# Patient Record
Sex: FEMALE | Race: BLACK OR AFRICAN AMERICAN | Employment: UNEMPLOYED | URBAN - METROPOLITAN AREA
[De-identification: names, ages, dates, MRNs, and addresses within clinical notes are randomized per-mention and may not be internally consistent; named-entity substitution may affect disease eponyms.]

---
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
## 2019-06-14 DIAGNOSIS — N94.6 DYSMENORRHEA: ICD-10-CM

## 2019-06-14 RX ORDER — IBUPROFEN 800 MG/1
TABLET ORAL
Qty: 100 TAB | Refills: 0 | Status: SHIPPED | OUTPATIENT
Start: 2019-06-14 | End: 2021-08-25

## 2019-06-18 ENCOUNTER — OFFICE VISIT (OUTPATIENT)
Dept: FAMILY MEDICINE CLINIC | Age: 19
End: 2019-06-18

## 2019-06-18 VITALS
DIASTOLIC BLOOD PRESSURE: 80 MMHG | OXYGEN SATURATION: 98 % | SYSTOLIC BLOOD PRESSURE: 100 MMHG | WEIGHT: 156 LBS | HEIGHT: 61 IN | TEMPERATURE: 97 F | HEART RATE: 82 BPM | BODY MASS INDEX: 29.45 KG/M2 | RESPIRATION RATE: 18 BRPM

## 2019-06-18 DIAGNOSIS — N89.8 VAGINAL DISCHARGE: Primary | ICD-10-CM

## 2019-06-18 DIAGNOSIS — B37.31 VAGINAL CANDIDIASIS: ICD-10-CM

## 2019-06-18 RX ORDER — FLUCONAZOLE 150 MG/1
150 TABLET ORAL
Qty: 2 TAB | Refills: 0 | Status: SHIPPED | OUTPATIENT
Start: 2019-06-18 | End: 2019-06-22

## 2019-06-18 RX ORDER — KETOCONAZOLE 20 MG/G
CREAM TOPICAL DAILY
Qty: 30 G | Refills: 0 | Status: SHIPPED | OUTPATIENT
Start: 2019-06-18 | End: 2021-08-25

## 2019-06-18 NOTE — PROGRESS NOTES
History of Present Illness  Rhoda Chahal is a 20 y.o. female who presents today for management of    Chief Complaint   Patient presents with    Vaginal Discharge     odor, itcthing x 1 month        Vaginitis  Patient complains of an abnormal vaginal discharge for 1 month. Vaginal symptoms include discharge described as creamy, malodorous and yellowish. Vulvar symptoms include vulvar itching. STI Risk: Very low risk of STD exposure. Menstrual pattern: She had been bleeding regularly. Contraception: none    Past Medical History  History reviewed. No pertinent past medical history. Surgical History  History reviewed. No pertinent surgical history. Current Medications  Current Outpatient Medications   Medication Sig    ibuprofen (MOTRIN) 800 mg tablet TAKE 1 TABLET BY MOUTH EVERY 8 HOURS AS NEEDED FOR PAIN     No current facility-administered medications for this visit.         Allergies/Drug Reactions  No Known Allergies     Family History  Family History   Problem Relation Age of Onset    No Known Problems Mother     Stroke Father 36    Hypertension Father     Diabetes Father     No Known Problems Sister     No Known Problems Maternal Grandmother     No Known Problems Paternal Grandmother     Stroke Paternal Grandfather         Social History  Social History     Socioeconomic History    Marital status: SINGLE     Spouse name: Not on file    Number of children: Not on file    Years of education: Not on file    Highest education level: Not on file   Occupational History    Not on file   Social Needs    Financial resource strain: Not on file    Food insecurity:     Worry: Not on file     Inability: Not on file    Transportation needs:     Medical: Not on file     Non-medical: Not on file   Tobacco Use    Smoking status: Never Smoker    Smokeless tobacco: Never Used   Substance and Sexual Activity    Alcohol use: Yes     Comment: socially    Drug use: No    Sexual activity: Yes     Partners: Male     Birth control/protection: Condom   Lifestyle    Physical activity:     Days per week: Not on file     Minutes per session: Not on file    Stress: Not on file   Relationships    Social connections:     Talks on phone: Not on file     Gets together: Not on file     Attends Faith service: Not on file     Active member of club or organization: Not on file     Attends meetings of clubs or organizations: Not on file     Relationship status: Not on file    Intimate partner violence:     Fear of current or ex partner: Not on file     Emotionally abused: Not on file     Physically abused: Not on file     Forced sexual activity: Not on file   Other Topics Concern    Not on file   Social History Narrative    Not on file       Review of Systems  Negative except as mentioned in HPI      Physical Exam  Vital signs:   Vitals:    06/18/19 0758   BP: 100/80   Pulse: 82   Resp: 18   Temp: 97 °F (36.1 °C)   TempSrc: Oral   SpO2: 98%   Weight: 156 lb (70.8 kg)   Height: 5' 1\" (1.549 m)       General: alert, oriented, not in distress  Eyes: clear conjunctivae, anicteric sclerae, full and equal ROMs  Pelvic exam: VULVA: normal appearing vulva with no masses, tenderness or lesions, VAGINA: erythematous vaginal walls, whitish curdlike discharge, no lesions, CERVIX: normal appearing cervix without discharge or lesions, URETHRAL MEATUS: no erythema or lesions present, PERINEUM: no evidence of trauma, no rashes or skin lesions present, ANUS: within normal limits, no hemorrhoids present      Laboratory/Tests:  Component      Latest Ref Rng & Units 11/5/2018 11/5/2018 11/5/2018 11/5/2018           8:44 AM  8:44 AM  8:44 AM  8:44 AM   Glucose      70 - 99 mg/dL 99      BUN      6 - 22 mg/dL 13      Creatinine      0.5 - 1.2 mg/dL 0.6      Sodium      133 - 145 mmol/L 140      Potassium      3.5 - 5.5 mmol/L 4.5      Chloride      98 - 110 mmol/L 100      CO2      20 - 32 mmol/L 23      AST      10 - 37 U/L 17      ALT (SGPT) 5 - 40 U/L 11      Alk. phosphatase      25 - 115 U/L 62      Bilirubin, total      0.2 - 1.2 mg/dL <0.1 (L)      Calcium      8.4 - 10.5 mg/dL 9.4      Protein, total      6.4 - 8.3 g/dL 7.1      Albumin      3.5 - 5.0 g/dL 4.3      A-G Ratio      1.1 - 2.6 ratio 1.5      Globulin      2.0 - 4.0 g/dL 2.8      Anion gap      mmol/L 17.0      GFRAA      >60.0 >60.0      GFRNA      >60.0 >60.0      WBC      4.0 - 11.0 K/uL   5.9    RBC      3.80 - 5.20 M/uL   4.94    HGB      11.7 - 15.5 g/dL   10.2 (L)    HCT      35.1 - 46.5 %   35.5    MCV      80 - 95 fL   72 (L)    MCH      26 - 34 pg   21 (L)    MCHC      31 - 36 g/dL   29 (L)    RDW      10.0 - 15.5 %   17.4 (H)    PLATELET      176 - 114 K/uL   249    Triglyceride      40 - 149 mg/dL  47     HDL Cholesterol      40 - 59 mg/dL  48     Cholesterol, total      110 - 200 mg/dL  134     CHOLESTEROL/HDL      0.0 - 5.0  2.8     LDL, calculated      50 - 99 mg/dL  77     VLDL, calculated      8 - 30 mg/dL  9     Hemoglobin A1c, (calculated)      4.8 - 5.9 %    5.2   AVG GLU      91 - 123 mg/dL    102       Assessment/Plan:      ICD-10-CM ICD-9-CM    1. Vaginal discharge N89.8 623.5 NUSWAB VAGINITIS PLUS   2. Vaginal candidiasis B37.3 112.1 fluconazole (DIFLUCAN) 150 mg tablet      ketoconazole (NIZORAL) 2 % topical cream           Follow-up and Dispositions    · Return in about 5 months (around 11/18/2019), or if symptoms worsen or fail to improve, for annual physical exam.           I have discussed the diagnosis with the patient and the intended plan as seen in the above orders. The patient has received an after-visit summary and questions were answered concerning future plans. I have discussed medication side effects and warnings with the patient as well. I have reviewed the plan of care with the patient, accepted their input and they are in agreement with the treatment goals.        Loren Alberto MD  June 18, 2019

## 2019-06-18 NOTE — PROGRESS NOTES
1. Have you been to the ER, urgent care clinic since your last visit? Hospitalized since your last visit? No    2. Have you seen or consulted any other health care providers outside of the [address] [place] [address] since your last visit? Include any pap smears or colon screening.  No

## 2019-06-20 LAB
A VAGINAE DNA VAG QL NAA+PROBE: ABNORMAL SCORE
BVAB2 DNA VAG QL NAA+PROBE: ABNORMAL SCORE
C ALBICANS DNA VAG QL NAA+PROBE: POSITIVE
C GLABRATA DNA VAG QL NAA+PROBE: NEGATIVE
C TRACH RRNA SPEC QL NAA+PROBE: NEGATIVE
MEGA1 DNA VAG QL NAA+PROBE: ABNORMAL SCORE
N GONORRHOEA RRNA SPEC QL NAA+PROBE: NEGATIVE
SPECIMEN STATUS REPORT, ROLRST: NORMAL
T VAGINALIS RRNA SPEC QL NAA+PROBE: NEGATIVE

## 2021-08-25 ENCOUNTER — OFFICE VISIT (OUTPATIENT)
Dept: FAMILY MEDICINE CLINIC | Age: 21
End: 2021-08-25
Payer: MEDICAID

## 2021-08-25 VITALS
HEIGHT: 61 IN | SYSTOLIC BLOOD PRESSURE: 103 MMHG | DIASTOLIC BLOOD PRESSURE: 73 MMHG | RESPIRATION RATE: 15 BRPM | OXYGEN SATURATION: 99 % | TEMPERATURE: 98 F | BODY MASS INDEX: 32.51 KG/M2 | WEIGHT: 172.2 LBS | HEART RATE: 73 BPM

## 2021-08-25 DIAGNOSIS — N89.8 VAGINAL DISCHARGE: ICD-10-CM

## 2021-08-25 DIAGNOSIS — R07.89 RIGHT-SIDED CHEST WALL PAIN: ICD-10-CM

## 2021-08-25 DIAGNOSIS — Z11.3 ROUTINE SCREENING FOR STI (SEXUALLY TRANSMITTED INFECTION): ICD-10-CM

## 2021-08-25 DIAGNOSIS — Z01.419 WELL WOMAN EXAM WITH ROUTINE GYNECOLOGICAL EXAM: Primary | ICD-10-CM

## 2021-08-25 DIAGNOSIS — R51.9 CHRONIC DAILY HEADACHE: ICD-10-CM

## 2021-08-25 PROCEDURE — 99395 PREV VISIT EST AGE 18-39: CPT | Performed by: INTERNAL MEDICINE

## 2021-08-25 RX ORDER — AMITRIPTYLINE HYDROCHLORIDE 10 MG/1
10 TABLET, FILM COATED ORAL
Qty: 90 TABLET | Refills: 0 | Status: SHIPPED | OUTPATIENT
Start: 2021-08-25 | End: 2021-11-29 | Stop reason: SDUPTHER

## 2021-08-25 RX ORDER — MINOCYCLINE HYDROCHLORIDE 50 MG/1
TABLET ORAL
COMMUNITY
Start: 2020-06-08

## 2021-08-25 NOTE — PROGRESS NOTES
Janis Damon presents today for No chief complaint on file. Is someone accompanying this pt? no    Is the patient using any DME equipment during [address]? no    Depression Screening:  3 most recent PHQ Screens 8/25/2021   Little interest or pleasure in doing things Not at all   Feeling down, depressed, irritable, or hopeless Not at all   Total Score PHQ 2 0       Learning Assessment:  Learning Assessment 8/25/2021   PRIMARY LEARNER Patient   HIGHEST LEVEL OF EDUCATION - PRIMARY LEARNER  4 YEARS OF COLLEGE   BARRIERS PRIMARY LEARNER NONE   CO-LEARNER CAREGIVER No   PRIMARY LANGUAGE ENGLISH   LEARNER PREFERENCE PRIMARY VIDEOS   ANSWERED BY patient   RELATIONSHIP SELF       Abuse Screening:  Abuse Screening Questionnaire 8/25/2021   Do you ever feel afraid of your partner? N   Are you in a relationship with someone who physically or mentally threatens you? N   Is it safe for you to go home? Y       Fall Risk  No flowsheet data found. OPIOID RISK TOOL  No flowsheet data found. Coordination of Care:  1. Have you been to the ER, urgent care clinic since your last visit? Yes, strep throat  Hospitalized since your last visit? no    2. Have you seen or consulted any other health care providers outside of the [address] [place] [address] since your last visit? Yes, dermatologist Include any pap smears or colon screening.  no    Last  Checked N/A  Last UDS Checked N/A  Last Pain Agreement Signed N/A

## 2021-08-25 NOTE — PATIENT INSTRUCTIONS
Well Visit, Ages 25 to 48: Care Instructions  Overview     Well visits can help you stay healthy. Your doctor has checked your overall health and may have suggested ways to take good care of yourself. Your doctor also may have recommended tests. At home, you can help prevent illness with healthy eating, regular exercise, and other steps. Follow-up care is a key part of your treatment and safety. Be sure to make and go to all appointments, and call your doctor if you are having problems. It's also a good idea to know your test results and keep a list of the medicines you take. How can you care for yourself at home? · Get screening tests that you and your doctor decide on. Screening helps find diseases before any symptoms appear. · Eat healthy foods. Choose fruits, vegetables, whole grains, protein, and low-fat dairy foods. Limit fat, especially saturated fat. Reduce salt in your diet. · Limit alcohol. If you are a man, have no more than 2 drinks a day or 14 drinks a week. If you are a woman, have no more than 1 drink a day or 7 drinks a week. · Get at least 30 minutes of physical activity on most days of the week. Walking is a good choice. You also may want to do other activities, such as running, swimming, cycling, or playing tennis or team sports. Discuss any changes in your exercise program with your doctor. · Reach and stay at a healthy weight. This will lower your risk for many problems, such as obesity, diabetes, heart disease, and high blood pressure. · Do not smoke or allow others to smoke around you. If you need help quitting, talk to your doctor about stop-smoking programs and medicines. These can increase your chances of quitting for good. · Care for your mental health. It is easy to get weighed down by worry and stress. Learn strategies to manage stress, like deep breathing and mindfulness, and stay connected with your family and community.  If you find you often feel sad or hopeless, talk with your doctor. Treatment can help. · Talk to your doctor about whether you have any risk factors for sexually transmitted infections (STIs). You can help prevent STIs if you wait to have sex with a new partner (or partners) until you've each been tested for STIs. It also helps if you use condoms (male or female condoms) and if you limit your sex partners to one person who only has sex with you. Vaccines are available for some STIs, such as HPV. · Use birth control if it's important to you to prevent pregnancy. Talk with your doctor about the choices available and what might be best for you. · If you think you may have a problem with alcohol or drug use, talk to your doctor. This includes prescription medicines (such as amphetamines and opioids) and illegal drugs (such as cocaine and methamphetamine). Your doctor can help you figure out what type of treatment is best for you. · Protect your skin from too much sun. When you're outdoors from 10 a.m. to 4 p.m., stay in the shade or cover up with clothing and a hat with a wide brim. Wear sunglasses that block UV rays. Even when it's cloudy, put broad-spectrum sunscreen (SPF 30 or higher) on any exposed skin. · See a dentist one or two times a year for checkups and to have your teeth cleaned. · Wear a seat belt in the car. When should you call for help? Watch closely for changes in your health, and be sure to contact your doctor if you have any problems or symptoms that concern you. Where can you learn more? Go to http://www.JackBe.com/  Enter P072 in the search box to learn more about \"Well Visit, Ages 25 to 48: Care Instructions. \"  Current as of: May 27, 2020               Content Version: 12.8  © 5990-3715 Healthwise, Incorporated. Care instructions adapted under license by [employer] (which disclaims liability or warranty for this information).  If you have questions about a medical condition or this instruction, always ask your healthcare professional. [place] [address] any warranty or liability for your use of this information.

## 2021-08-25 NOTE — PROGRESS NOTES
ANNUAL PHYSICAL EXAMINATION    History of Present Illness  Rhoda Chahal is a 24 y.o. female who presents today for management of    Chief Complaint   Patient presents with    Complete Physical    Headache         Patient complains of chronic headaches for 1.5 years. Pain is localized to the frontal area, constant, duration few days, described as sharp, throbbing and pressure. Pain severity 8/10. She does not have a headache today. Triggers: none. It is aggravated by lack of sleep. It is relieved by ibuprofen 800mg. No previous history of migraine headaches. Denies blurred vision, dizziness, weakness or numbness. Gynecologic History  Patient's last menstrual period was Patient's last menstrual period was 07/20/2021 (exact date). Contraception: condoms  Last Pap: few years ago  Results: reported as normal    Health Maintenance  Colon cancer: due at age 38  Dyslipidemia: ordered today  Diabetes mellitus: ordered today  Influenza vaccine: season ended  Pneumococcal vaccine: not Indicated  Tdap: due  Herpes Zoster vaccine: due at age 47  Hep B vaccine: not Indicated   Weight: Body mass index is Estimated body mass index is Body mass index is 32.54 kg/m². Shola Lopez Discussed the patient's BMI with her. The BMI follow up plan is as follows: Improve diet and 30 min of moderate activity at least 5 times a week. Exercise:  yes  Seatbelt: yes  Dentist: yes      Past Medical History  Past Medical History:   Diagnosis Date    Migraine         Surgical History  History reviewed. No pertinent surgical history. Current Medications  Current Outpatient Medications   Medication Sig    minocycline (DYNACIN) 50 mg tablet     amitriptyline (ELAVIL) 10 mg tablet Take 1 Tablet by mouth nightly. No current facility-administered medications for this visit.        Allergies/Drug Reactions  No Known Allergies     Family History  Family History   Problem Relation Age of Onset    No Known Problems Mother     Stroke Father 36   Tellez Hypertension Father     Diabetes Father     No Known Problems Sister     No Known Problems Maternal Grandmother     No Known Problems Paternal Grandmother     Stroke Paternal Grandfather         Social History  Social History     Socioeconomic History    Marital status: SINGLE     Spouse name: Not on file    Number of children: Not on file    Years of education: Not on file    Highest education level: Not on file   Tobacco Use    Smoking status: Never Smoker    Smokeless tobacco: Never Used   Substance and Sexual Activity    Alcohol use: Yes     Comment: socially    Drug use: No    Sexual activity: Yes     Partners: Male     Birth control/protection: Condom     Social Determinants of Health     Financial Resource Strain:     Difficulty of Paying Living Expenses:    Food Insecurity:     Worried About Running Out of Food in the Last Year:     Ran Out of Food in the Last Year:    Transportation Needs:     Lack of Transportation (Medical):      Lack of Transportation (Non-Medical):    Physical Activity:     Days of Exercise per Week:     Minutes of Exercise per Session:    Stress:     Feeling of Stress :    Social Connections:     Frequency of Communication with Friends and Family:     Frequency of Social Gatherings with Friends and Family:     Attends Shinto Services:     Active Member of Clubs or Organizations:     Attends Club or Organization Meetings:     Marital Status:         Health Maintenance   Topic Date Due    HPV Age 9Y-34Y (1 - 2-dose series) Never done    COVID-19 Vaccine (1) Never done    PAP AKA CERVICAL CYTOLOGY  Never done    DTaP/Tdap/Td series (1 - Tdap) 12/02/2030 (Originally 4/27/2020)    Flu Vaccine (1) 09/01/2021    Hepatitis C Screening  Completed    Pneumococcal 0-64 years  Aged [employer] History   Administered Date(s) Administered    Influenza Vaccine [employer]) PF (>6 Mo Flulaval, Fluarix, and >3 Yrs [address] [place] [address]) 11/28/2017       Review of Systems  General ROS: negative  Psychological ROS: negative  Ophthalmic ROS: negative  ENT ROS: negative  Allergy and Immunology ROS: negative  Hematological and Lymphatic ROS: negative  Endocrine ROS: negative  Breast ROS: negative for breast lumps  Respiratory ROS: no cough, shortness of breath, or wheezing  Cardiovascular ROS: positive chest pain, no dyspnea on exertion  Gastrointestinal ROS: no abdominal pain, change in bowel habits, or black or bloody stools  Genito-Urinary ROS: no dysuria, trouble voiding, or hematuria  Musculoskeletal ROS: negative  Neurological ROS: positive for - headaches  Dermatological ROS: negative       Hearing Screening    125Hz 250Hz 500Hz 1000Hz 2000Hz 3000Hz 4000Hz 6000Hz 8000Hz   Right ear:   Pass Pass Pass  Pass     Left ear:   Pass Pass Pass  Pass        Visual Acuity Screening    Right eye Left eye Both eyes   Without correction: 20/15 20/15 20/13   With correction:            Physical Exam  Vital signs:   Vitals:    08/25/21 0903   BP: 103/73   Pulse: 73   Resp: 15   Temp: 98 °F (36.7 °C)   TempSrc: Skin   SpO2: 99%   Weight: 172 lb 3.2 oz (78.1 kg)   Height: 5' 1\" (1.549 m)       General: alert, oriented, not in distress  Head: scalp normal, atraumatic  Eyes: pupils are equal and reactive, full and intact EOM's  Ears: patent ear canal, intact tympanic membrane  Nose: normal turbinates, no congestion or discharge  Lips/Mouth: moist lips and buccal mucosa, non-enlarged tonsils, pink throat  Neck: supple, no JVD, no lymphadenopathy, non-palpable thyroid  Chest/Lungs: clear breath sounds, no wheezing or crackles  Heart: normal rate, regular rhythm, no murmur  Breasts: right breast normal without mass, skin or nipple changes or axillary nodes, left breast normal without mass, skin or nipple changes or axillary nodes  Abdomen: soft, non-distended, non-tender, normal bowel sounds, no organomegaly, no masses  Extremities: no focal deformities, no edema  Skin: no active skin lesions  Pelvic exam: VULVA: normal appearing vulva with no masses, tenderness or lesions, VAGINA: normal appearing vagina with normal color, moderate whitish discharge, no lesions, CERVIX: normal appearing cervix without discharge or lesions, URETHRAL MEATUS: no erythema or lesions present, PERINEUM: no evidence of trauma, no rashes or skin lesions present, ANUS: within normal limits, no hemorrhoids present    Assessment/Plan:      ICD-10-CM ICD-9-CM    1. Well woman exam with routine gynecological exam  Z01.419 V72.31 PAP IG, APTIMA HPV AND RFX 16/18,45 ([id number])      CBC WITH AUTOMATED DIFF      HEMOGLOBIN A1C WITH EAG      LIPID PANEL      METABOLIC PANEL, COMPREHENSIVE   2. Routine screening for STI (sexually transmitted infection)  Z11.3 V74.5 HIV 1/2 AG/AB, 4TH GENERATION,W RFLX CONFIRM      T PALLIDUM SCREEN W/REFLEX      CANCELED: CT/NG/T.VAGINALIS AMPLIFICATION   3. Chronic daily headache  R51.9 784.0 amitriptyline (ELAVIL) 10 mg tablet   4. Vaginal discharge  N89.8 623.5 NUSWAB VAGINITIS PLUS   5. Right-sided chest wall pain  R07.89 786.52        Chest wall pain  - suspect MSK  - limit upper body strain  - pain control    Headaches  - trial of amitriptyline      Patient Counseling:  --Nutrition: Stressed importance of moderation in sodium/caffeine intake, saturated fat and cholesterol, caloric balance, sufficient intake of fresh fruits, vegetables. --Exercise: Stressed the importance of regular exercise. --Substance Abuse: Discussed cessation/primary prevention of tobacco, alcohol, or other drug use; driving or other dangerous activities under the influence; availability of treatment for abuse. --Sexuality: Discussed sexually transmitted diseases, partner selection, use of condoms, avoidance of unintended pregnancy and contraceptive alternatives. --Injury prevention: Discussed safety belts, safety helmets, smoke detector, smoking near bedding or upholstery.    --Dental health: Discussed importance of regular tooth brushing, flossing, and dental visits. --Immunizations reviewed. Follow-up and Dispositions    · Return in about 3 months (around 11/25/2021) for follow-up, doxy. I have discussed the diagnosis with the patient and the intended plan as seen in the above orders. The patient has received an after-visit summary and questions were answered concerning future plans. I have discussed medication side effects and warnings with the patient as well. I have reviewed the plan of care with the patient, accepted their input and they are in agreement with the treatment goals.        Toshia Neal MD  August 25, 2021

## 2021-08-27 LAB
A VAGINAE DNA VAG QL NAA+PROBE: NORMAL SCORE
ALBUMIN SERPL-MCNC: 4.5 G/DL (ref 3.9–5)
ALBUMIN/GLOB SERPL: 1.5 {RATIO} (ref 1.2–2.2)
ALP SERPL-CCNC: 52 IU/L (ref 48–121)
ALT SERPL-CCNC: 15 IU/L (ref 0–32)
AST SERPL-CCNC: 17 IU/L (ref 0–40)
BASOPHILS # BLD AUTO: 0 X10E3/UL (ref 0–0.2)
BASOPHILS NFR BLD AUTO: 1 %
BILIRUB SERPL-MCNC: 0.2 MG/DL (ref 0–1.2)
BUN SERPL-MCNC: 10 MG/DL (ref 6–20)
BUN/CREAT SERPL: 14 (ref 9–23)
BVAB2 DNA VAG QL NAA+PROBE: NORMAL SCORE
C ALBICANS DNA VAG QL NAA+PROBE: NEGATIVE
C GLABRATA DNA VAG QL NAA+PROBE: NEGATIVE
C TRACH DNA VAG QL NAA+PROBE: NEGATIVE
CALCIUM SERPL-MCNC: 9.2 MG/DL (ref 8.7–10.2)
CHLORIDE SERPL-SCNC: 101 MMOL/L (ref 96–106)
CHOLEST SERPL-MCNC: 158 MG/DL (ref 100–199)
CO2 SERPL-SCNC: 26 MMOL/L (ref 20–29)
CREAT SERPL-MCNC: 0.69 MG/DL (ref 0.57–1)
EOSINOPHIL # BLD AUTO: 0.1 X10E3/UL (ref 0–0.4)
EOSINOPHIL NFR BLD AUTO: 1 %
ERYTHROCYTE [DISTWIDTH] IN BLOOD BY AUTOMATED COUNT: 15.4 % (ref 11.7–15.4)
EST. AVERAGE GLUCOSE BLD GHB EST-MCNC: 117 MG/DL
GLOBULIN SER CALC-MCNC: 3 G/DL (ref 1.5–4.5)
GLUCOSE SERPL-MCNC: 99 MG/DL (ref 65–99)
HBA1C MFR BLD: 5.7 % (ref 4.8–5.6)
HCT VFR BLD AUTO: 39.7 % (ref 34–46.6)
HDLC SERPL-MCNC: 50 MG/DL
HGB BLD-MCNC: 11.5 G/DL (ref 11.1–15.9)
HIV 1+2 AB+HIV1 P24 AG SERPL QL IA: NON REACTIVE
IMM GRANULOCYTES # BLD AUTO: 0 X10E3/UL (ref 0–0.1)
IMM GRANULOCYTES NFR BLD AUTO: 1 %
IMP & REVIEW OF LAB RESULTS: NORMAL
LDLC SERPL CALC-MCNC: 98 MG/DL (ref 0–99)
LYMPHOCYTES # BLD AUTO: 2.4 X10E3/UL (ref 0.7–3.1)
LYMPHOCYTES NFR BLD AUTO: 36 %
MCH RBC QN AUTO: 21.2 PG (ref 26.6–33)
MCHC RBC AUTO-ENTMCNC: 29 G/DL (ref 31.5–35.7)
MCV RBC AUTO: 73 FL (ref 79–97)
MEGA1 DNA VAG QL NAA+PROBE: NORMAL SCORE
MONOCYTES # BLD AUTO: 0.5 X10E3/UL (ref 0.1–0.9)
MONOCYTES NFR BLD AUTO: 8 %
N GONORRHOEA DNA VAG QL NAA+PROBE: NEGATIVE
NEUTROPHILS # BLD AUTO: 3.6 X10E3/UL (ref 1.4–7)
NEUTROPHILS NFR BLD AUTO: 53 %
PLATELET # BLD AUTO: 270 X10E3/UL (ref 150–450)
POTASSIUM SERPL-SCNC: 4.6 MMOL/L (ref 3.5–5.2)
PROT SERPL-MCNC: 7.5 G/DL (ref 6–8.5)
RBC # BLD AUTO: 5.42 X10E6/UL (ref 3.77–5.28)
SODIUM SERPL-SCNC: 139 MMOL/L (ref 134–144)
T VAGINALIS DNA VAG QL NAA+PROBE: NEGATIVE
TREPONEMA PALLIDUM IGG+IGM AB [PRESENCE] IN SERUM OR PLASMA BY IMMUNOASSAY: NON REACTIVE
TRIGL SERPL-MCNC: 44 MG/DL (ref 0–149)
VLDLC SERPL CALC-MCNC: 10 MG/DL (ref 5–40)
WBC # BLD AUTO: 6.6 X10E3/UL (ref 3.4–10.8)

## 2021-08-28 LAB
CYTOLOGIST CVX/VAG CYTO: NORMAL
CYTOLOGY CVX/VAG DOC CYTO: NORMAL
CYTOLOGY CVX/VAG DOC THIN PREP: NORMAL
DX ICD CODE: NORMAL
HPV I/H RISK 4 DNA CVX QL PROBE+SIG AMP: NEGATIVE
Lab: NORMAL
OTHER STN SPEC: NORMAL
STAT OF ADQ CVX/VAG CYTO-IMP: NORMAL

## 2021-11-02 ENCOUNTER — HOSPITAL ENCOUNTER (OUTPATIENT)
Dept: PHYSICAL THERAPY | Age: 21
Discharge: HOME | End: 2021-11-02
Payer: MEDICAID

## 2021-11-02 PROCEDURE — 97162 PT EVAL MOD COMPLEX 30 MIN: CPT
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
## 2021-11-17 ENCOUNTER — HOSPITAL ENCOUNTER (OUTPATIENT)
Dept: PHYSICAL THERAPY | Age: 21
Discharge: HOME | End: 2021-11-17
Payer: MEDICAID

## 2021-11-17 PROCEDURE — 97530 THERAPEUTIC ACTIVITIES: CPT

## 2021-11-17 PROCEDURE — 97112 NEUROMUSCULAR REEDUCATION: CPT

## 2021-11-17 PROCEDURE — 97110 THERAPEUTIC EXERCISES: CPT

## 2021-11-17 PROCEDURE — 97116 GAIT TRAINING THERAPY: CPT

## 2021-11-17 NOTE — PROGRESS NOTES
PHYSICAL THERAPY - DAILY TREATMENT NOTE    Patient Name: Seth Modoy        Date: 2021  : 2000   YES Patient  Verified  Visit #:   2     Ins[place]: [facility] [place] [facility] / Plan: [facility] / Product Type: Managed Care Medicaid /      In time: 403 Out time: 100   Total Treatment Time: 45     Medicare/BCBS Time Tracking (below)   Total Timed Codes (min):  NA 1:1 Treatment Time:  NA     TREATMENT AREA =  Neck pain [M54.2]  Left shoulder pain [M25.512]  Right shoulder pain [M25.511]    SUBJECTIVE    Pain Level (on 0 to 10 scale):  5  / 10   Medication Changes/New allergies or changes in medical history, any new surgeries or procedures? NO    If yes, update Summary List   Subjective Functional Status/Changes:  []  No changes reported     Pt reports increases ease with movement since SOC. Reports 5/10 pain in neck and knee. OBJECTIVE  Therapeutic Procedures:  Min Procedure Specifics + Rationale   (21) [x] Therapeutic Exercise    See Flowsheet   Rationale: increase ROM and increase strength to improve the patients ability to participate in ADL's      8 [x] Gait Training See Flow Sheet  Rationale: To improve functional mechanics associated with gait training on even and uneven surfaces to promote mobility     8 [x] Therapeutic Activity See Flow Sheet  Rationale: To improve safety, proprioception, coordination, and efficiency with tasks such as stair negotiation, transfers, bed mobility, and lifting mechanics   8 [x] Neuromuscular Re-Education See Flow Sheet  Rationale: To improve stability over various surfaces to decrease falls risk. Improve core control and posture control in various positions to maximize level of function.         min Patient Education:  YES Reviewed HEP         Other Objective/Functional Measures:    See flowsheet for more details    Mod/min VC and demos required throughout session for proper form and increased safety         Post Treatment Pain Level (on 0 to 10) scale:   0  / 10     ASSESSMENT    Assessment/Changes in Function:     Reports of muscular fatigue after movements but tolerated session well.      []  See Progress Note/Recertification   Patient will continue to benefit from skilled PT services to analyze,, cue,, progress,, modify,, demonstrate,, instruct, and address, movement patterns,, therapeutic interventions,, postural abnormalities,, soft tissue restrictions,, ROM,, strength,, functional mobility,, body mechanics/ergonomics, and home and community integration, to attain remaining goals.    Progress toward goals / Updated goals:    1st session since initial evaluation, no notable progress yet     PLAN    [x]  Upgrade activities as tolerated YES Continue plan of care   []  Discharge due to :    []  Other:      Therapist: Emile Talley DPT    Date: 11/17/2021 Time: 9:18 AM     Future Appointments   Date Time Provider Hannah Singh   11/17/2021  9:30 AM Kelli Matar [facility]   11/19/2021  9:30 AM Kelli Matar [facility]   11/22/2021  9:30 AM Jovanni 14 [facility], PT [facility]   11/29/2021  9:30 [facility]   11/29/2021  2:00 PM Jean Vigil MD DMA BS AMB

## 2021-11-19 ENCOUNTER — HOSPITAL ENCOUNTER (OUTPATIENT)
Dept: PHYSICAL THERAPY | Age: 21
Discharge: HOME | End: 2021-11-19
Payer: MEDICAID

## 2021-11-19 PROCEDURE — 97116 GAIT TRAINING THERAPY: CPT

## 2021-11-19 PROCEDURE — 97110 THERAPEUTIC EXERCISES: CPT

## 2021-11-19 PROCEDURE — 97530 THERAPEUTIC ACTIVITIES: CPT

## 2021-11-19 PROCEDURE — 97112 NEUROMUSCULAR REEDUCATION: CPT

## 2021-11-19 NOTE — PROGRESS NOTES
PHYSICAL THERAPY - DAILY TREATMENT NOTE    Patient Name: Seferino Eisenmenger        Date: 2021  : 2000   YES Patient  Verified  Visit #:   3     Insurance: Payor: [facility] MEDICAID / Plan: [address] [facility] / Product Type: Managed Care Medicaid /      In time: 115 Out time: 1026   Total Treatment Time: 56     Medicare/BCBS Time Tracking (below)   Total Timed Codes (min):  NA 1:1 Treatment Time:  NA     TREATMENT AREA =  Neck pain [M54.2]  Left shoulder pain [M25.512]  Right shoulder pain [M25.511]    SUBJECTIVE    Pain Level (on 0 to 10 scale):  6  / 10   Medication Changes/New allergies or changes in medical history, any new surgeries or procedures? NO    If yes, update Summary List   Subjective Functional Status/Changes:  []  No changes reported     Compliance with HEP  Yes [x] No []    Pt reports feeling not bad after last session and decreased pain. OBJECTIVE  Therapeutic Procedures:  Min Procedure Specifics + Rationale   (32) [x] Therapeutic Exercise    See Flowsheet   Rationale: increase ROM and increase strength to improve the patients ability to participate in ADL's      8 [x] Gait Training See Flow Sheet  Rationale: To improve functional mechanics associated with gait training on even and uneven surfaces to promote mobility     8 [x] Therapeutic Activity See Flow Sheet  Rationale: To improve safety, proprioception, coordination, and efficiency with tasks such as stair negotiation, transfers, bed mobility, and lifting mechanics   8 [x] Neuromuscular Re-Education See Flow Sheet  Rationale: To improve stability over various surfaces to decrease falls risk. Improve core control and posture control in various positions to maximize level of function.         min Patient Education:  YES Reviewed HEP         Other Objective/Functional Measures:    See flowsheet for more details         VC and demos required throughout session for proper form and increased safety Post Treatment Pain Level (on 0 to 10) scale:   0  / 10     ASSESSMENT    Assessment/Changes in Function:     Pt tolerated session well with only reports of muscular fatigue     []  See Progress Note/Recertification   Patient will continue to benefit from skilled PT services to analyze,, cue,, progress,, modify,, demonstrate,, instruct, and address, movement patterns,, therapeutic interventions,, postural abnormalities,, soft tissue restrictions,, ROM,, strength,, functional mobility,, body mechanics/ergonomics, and home and community integration, to attain remaining goals.    Progress toward goals / Updated goals:   STG 1 MET     PLAN    [x]  Upgrade activities as tolerated YES Continue plan of care   []  Discharge due to :    []  Other:      Therapist: Claude Pon, DPT    Date: 11/19/2021 Time: 9:38 AM     Future Appointments   Date Time Provider Hannah Singh   11/22/2021  9:30 AM Colten sEposito, PT [facility]   11/29/2021  9:30 AM Caroline BAUM [facility]   11/29/2021  2:00 PM Ifrah Bianchi MD DMA BS AMB

## 2021-11-22 ENCOUNTER — HOSPITAL ENCOUNTER (OUTPATIENT)
Dept: PHYSICAL THERAPY | Age: 21
Discharge: HOME | End: 2021-11-22
Payer: MEDICAID

## 2021-11-22 PROCEDURE — 97112 NEUROMUSCULAR REEDUCATION: CPT

## 2021-11-22 PROCEDURE — 97116 GAIT TRAINING THERAPY: CPT

## 2021-11-22 PROCEDURE — 97530 THERAPEUTIC ACTIVITIES: CPT

## 2021-11-22 PROCEDURE — 97110 THERAPEUTIC EXERCISES: CPT

## 2021-11-22 NOTE — PROGRESS NOTES
PHYSICAL THERAPY - DAILY TREATMENT NOTE    Patient Name: Samson Louis        Date: 2021  : 2000   YES Patient  Verified  Visit #:     Insurance: [place]: [place] [facility] [place] / Plan: [facility] / Product Type: Managed Care Medicaid /      In time: 9:35 Out time: 10:26   Total Treatment Time: 51     Medicare/BCBS [place] Time Tracking (below)   Total Timed Codes (min):  na 1:1 Treatment Time:  na     TREATMENT AREA =  Neck pain [M54.2]  Left shoulder pain [M25.512]  Right shoulder pain [M25.511]    SUBJECTIVE    Pain Level (on 0 to 10 scale):  5  / 10   Medication Changes/New allergies or changes in medical history, any new surgeries or procedures? NO    If yes, update Summary List   Subjective Functional Status/Changes:  []  No changes reported     Pt reports her neck is hurting her today          OBJECTIVE    15 min Therapeutic Exercise:  [x]  See flow sheet   Rationale:      increase ROM and increase strength to improve the patients ability to perform ADL's with greater ease. 15 min Therapeutic Activity: See flow sheet   Rationale:   increase mobility, endurance, and strength to improve patient's ability to complete functional tasks with greater ease. 13 min Neuromuscular Re-ed: See flow sheet   Rationale:   improve balance, coordination, proprioception to improve patient's ability to complete functional tasks safely. 8 min Gait Training:  See flow sheet   Rationale: To improve functional mechanics associated with gait training on even and uneven surfaces to promote mobility       min Patient Education:  YES  Reviewed HEP   []  Progressed/Changed HEP based on:   Cont with HEP     Other Objective/Functional Measures:    Poor squatting mechanics observed. LBP reported during. Pt with increased lumbar lordosis with descend; corrected mechanics which resolved LBP during activity.      Pt tolerates neutral wrist position more than extended wrists when performing bird dogs    Tolerated progression of strengthening without reproduction of symptoms. Post Treatment Pain Level (on 0 to 10) scale:   0  / 10     ASSESSMENT    Assessment/Changes in Function:     Session tolerated well with fatigue reported at the end of treatment. [place]. Plan for DC within the next couple of visits with updated HEP. []  See Progress Note/Recertification   Patient will continue to benefit from skilled PT services to analyze, cue, progress, modify,, demonstrate, instruct, and address, movement patterns, therapeutic interventions, postural abnormalities, soft tissue restrictions, ROM, strength, functional mobility, body mechanics/ergonomics, and home and community integration, to attain remaining goals. Progress toward goals / Updated goals: · Short Term Goals: To be accomplished in  2-3  weeks: From 11/2/21  1. Patient will demonstrate compliance with HEP for symptom management at home. 2. Patient will demonstrate at least 60 degrees C/S rotation AROM bilaterally to improve safety and efficiency with driving. 3. Patient will demonstrate at least 120 degrees of knee flexion AROM bilaterally to improve safety and efficiency with stair negotiation.        PLAN    []  Upgrade activities as tolerated YES Continue plan of care   []  Discharge due to :    []  Other:      Therapist: Laverne Montesinos PT, DPT    Date: 11/22/2021 Time: 7:59 AM     Future Appointments   Date Time Provider Hannah Singh   11/22/2021  9:30 AM Beni Rm PT [facility]   11/29/2021  9:30 AM Lubna BAUM [facility]   11/29/2021  2:00 PM Terri Delcid MD DMA BS AMB

## 2021-11-29 ENCOUNTER — HOSPITAL ENCOUNTER (OUTPATIENT)
Dept: PHYSICAL THERAPY | Age: 21
Discharge: HOME | End: 2021-11-29
Payer: MEDICAID

## 2021-11-29 ENCOUNTER — VIRTUAL VISIT (OUTPATIENT)
Dept: FAMILY MEDICINE CLINIC | Age: 21
End: 2021-11-29
Payer: MEDICAID

## 2021-11-29 DIAGNOSIS — R51.9 CHRONIC DAILY HEADACHE: ICD-10-CM

## 2021-11-29 DIAGNOSIS — M54.2 NECK PAIN: Primary | ICD-10-CM

## 2021-11-29 PROCEDURE — 97116 GAIT TRAINING THERAPY: CPT

## 2021-11-29 PROCEDURE — 97112 NEUROMUSCULAR REEDUCATION: CPT

## 2021-11-29 PROCEDURE — 97530 THERAPEUTIC ACTIVITIES: CPT

## 2021-11-29 PROCEDURE — 97110 THERAPEUTIC EXERCISES: CPT

## 2021-11-29 PROCEDURE — 99213 OFFICE O/P EST LOW 20 MIN: CPT | Performed by: INTERNAL MEDICINE

## 2021-11-29 RX ORDER — AMITRIPTYLINE HYDROCHLORIDE 10 MG/1
10 TABLET, FILM COATED ORAL
Qty: 90 TABLET | Refills: 1 | Status: SHIPPED | OUTPATIENT
Start: 2021-11-29 | End: 2022-07-20
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
## 2021-11-29 NOTE — PROGRESS NOTES
PHYSICAL THERAPY - DAILY TREATMENT NOTE    Patient Name: Olga Hayward        Date: 2021  : 2000   YES Patient  Verified  Visit #:     Insurance: Payor: [facility] MEDICAID / Plan: [address] [facility] / Product Type: Managed Care Medicaid /      In time: 550 Out time: 1020   Total Treatment Time: 50     Medicare/BCBS Time Tracking (below)   Total Timed Codes (min):  NA 1:1 Treatment Time:  NA     TREATMENT AREA =  Neck pain [M54.2]  Left shoulder pain [M25.512]  Right shoulder pain [M25.511]    SUBJECTIVE    Pain Level (on 0 to 10 scale):  0  / 10   Medication Changes/New allergies or changes in medical history, any new surgeries or procedures? NO    If yes, update Summary List   Subjective Functional Status/Changes:  []  No changes reported     Amenda reports not having any pain since last session. Compliance with HEP  Yes [] No []         OBJECTIVE  Therapeutic Procedures:  Min Procedure Specifics + Rationale   (36) [x] Therapeutic Exercise    See Flowsheet   Rationale: increase ROM and increase strength to improve the patients ability to participate in ADL's      8 [x] Gait Training See Flow Sheet  Rationale: To improve functional mechanics associated with gait training on even and uneven surfaces to promote mobility     8 [x] Therapeutic Activity See Flow Sheet  Rationale: To improve safety, proprioception, coordination, and efficiency with tasks such as stair negotiation, transfers, bed mobility, and lifting mechanics   8 [x] Neuromuscular Re-Education See Flow Sheet  Rationale: To improve stability over various surfaces to decrease falls risk. Improve core control and posture control in various positions to maximize level of function.         min Patient Education:  YES Reviewed HEP         Other Objective/Functional Measures:    See flowsheet for more details  Added C/S rotation c OP    Min VC and demos required throughout session for proper form and increased safety         Post Treatment Pain Level (on 0 to 10) scale:   0  / 10     ASSESSMENT    Assessment/Changes in Function:     Today was the first day patient did not report pain in the beginning of session. She indicated no symptoms within the last week. Tolerated treatment session well.     []  See Progress Note/Recertification   Patient will continue to benefit from skilled PT services to analyze,, cue,, progress,, modify,, demonstrate,, instruct, and address, movement patterns,, therapeutic interventions,, postural abnormalities,, soft tissue restrictions,, ROM,, strength,, functional mobility,, body mechanics/ergonomics, and home and community integration, to attain remaining goals. Progress toward goals / Updated goals:     Added C/S Rotation c OP for STG 2       PLAN    [x]  Upgrade activities as tolerated YES Continue plan of care   []  Discharge due to :    []  Other:      Therapist: Phuc Thomas DPT    Date: 11/29/2021 Time: 9:35 AM     Future Appointments   Date Time Provider Hannah Singh   11/29/2021  2:00 PM Chrissy Sofia MD [facility] BS AMB   12/6/2021  9:30 AM Joycelyn Bryant [facility]

## 2021-12-03 ENCOUNTER — HOSPITAL ENCOUNTER (OUTPATIENT)
Dept: PHYSICAL THERAPY | Age: 21
Discharge: HOME | End: 2021-12-03
Payer: MEDICAID

## 2021-12-03 PROCEDURE — 97112 NEUROMUSCULAR REEDUCATION: CPT

## 2021-12-03 PROCEDURE — 97530 THERAPEUTIC ACTIVITIES: CPT

## 2021-12-03 PROCEDURE — 97110 THERAPEUTIC EXERCISES: CPT

## 2021-12-03 NOTE — PROGRESS NOTES
[address] PHYSICAL THERAPY  [address] [place] [address] Nikolay Okeefe, [address] - Phone: [phone]  Fax: [phone]  DISCHARGE SUMMARY FOR PHYSICAL THERAPY          Patient Name: Bg Parsons : 2000   Treatment/Medical Diagnosis: Neck pain [M54.2]  Left shoulder pain [M25.512]  Right shoulder pain [M25.511]   Referral Source: Lianna Hyde DO Start of Care [facility]): 2021   Prior Hospitalization: See medical history Provider #: [id number]   Visits from [facility]: 6 Missed Visits: 0     Goal/Measure of Progress Goal Met? 1. Patient will demonstrate at least 60 degrees C/S rotation AROM bilaterally to improve safety and efficiency with driving. Status at last Eval: 60 bilat Current Status: 60* L  70* R yes   2. Patient will demonstrate at least 120 degrees of knee flexion AROM bilaterally to improve safety and efficiency with stair negotiation. Status at last Eval: 105* Current Status: 125* yes   3. Patient will improve FOTO score to at least 75 points to indicate improved functional status   Status at last Eval: 63 Current Status: 92 yes   4. Patient will demonstrate at least 4/5 shoulder flexion MMT bilaterally to improve efficiency with overhead ADL's. Status at last Eval: 4 Current Status: 5- yes     5. Patient will lift a 25# box from the floor 5/5 times with appropriate mechanics and no increase in symptoms to improve safety with housecleaning ADL's. ABLE; MET      Key Functional Changes/Progress: Reassessment performed today. Pt reports 90% improvement since initiating PT. Pt demonstrates gains in strength and range of motion in all directions/planes of motion. She reports feeling lower back stiffness specifically in the morning but performs open book stretch which alleviates tension. Pt is able to demonstrate independence with learned exercises.  Pt has met all of her short and long term goals at this time and is being discharged today secondary to above factors. Pt was educated to continue with daily exercise program and to contact the clinic should symptoms return or worsen. Thank you for allowing me the opportunity to assist in this case. Assessments/Recommendations: Discontinue therapy. Progressing towards or have reached established goals. If you have any questions/comments please contact us directly at [phone]. Thank you for allowing us to assist in the care of your patient. Therapist Signature: Familia Villegas PT, DPT Date: 12/3/2021   Reporting Period: na Time: 68:78 AM      Certification Period: na       NOTE TO PHYSICIAN:  PLEASE COMPLETE THE ORDERS BELOW AND FAX TO   [facility]: [phone]. If you are unable to process this request in 24 hours please contact our office: [phone].    ___ I have read the above report and request that my patient be discharged from therapy.      Physician Signature:        Date:       Time:

## 2021-12-03 NOTE — PROGRESS NOTES
PHYSICAL THERAPY - DAILY TREATMENT NOTE    Patient Name: Anna Azevedo        Date: 12/3/2021  : 2000   YES Patient  Verified  Visit #:     Insurance: Payor: Liliana [employer]one / Plan: [facility] / Product Type: Managed Care Medicaid /      In time: 11:00 Out time: 11:41   Total Treatment Time: 41     Medicare/BCBS [place] Time Tracking (below)   Total Timed Codes (min):  na 1:1 Treatment Time:  na     TREATMENT AREA =  Neck pain [M54.2]  Left shoulder pain [M25.512]  Right shoulder pain [M25.511]    SUBJECTIVE    Pain Level (on 0 to 10 scale):  0  / 10   Medication Changes/New allergies or changes in medical history, any new surgeries or procedures? NO    If yes, update Summary List   Subjective Functional Status/Changes:  []  No changes reported     Patient reports going to the gym prior to this session and feels more ms soreness rather than pain. Pain levels have improved. Would like to be discharged from PT today; is leaving for [place] on Dec[place]th. OBJECTIVE    15 min Therapeutic Exercise:  [x]  See flow sheet   Rationale:      increase ROM and increase strength to improve the patients ability to perform ADL's with greater ease. 15 min Therapeutic Activity: See flow sheet  Reassessment and FOTO   Rationale:   increase mobility, endurance, and strength to improve patient's ability to complete functional tasks with greater ease. 11 min Neuromuscular Re-ed: See flow sheet   Rationale:   improve balance, coordination, proprioception to improve patient's ability to complete functional tasks safely.         min Patient Education:  YES  Reviewed HEP   [x]  Progressed/Changed HEP based on:   Updated HEP     Other Objective/Functional Measures:    Refer to DC note     Post Treatment Pain Level (on 0 to 10) scale:   0  / 10     ASSESSMENT    Assessment/Changes in Function:     Refer to DC note     []  See Progress Note/Recertification   Patient will continue to benefit from skilled PT services to analyze, cue, progress, modify,, demonstrate, instruct, and address, movement patterns, therapeutic interventions, postural abnormalities, soft tissue restrictions, ROM, strength, functional mobility, body mechanics/ergonomics, and home and community integration, to attain remaining goals.    Progress toward goals / Updated goals:    Refer to DC note     PLAN    []  Upgrade activities as tolerated NO Continue plan of care   []  Discharge due to :    []  Other:      Therapist: Jacqueline Hernandez PT, DPT    Date: 12/3/2021 Time: 10:58 AM     Future Appointments   Date Time Provider Hannah Singh   12/3/2021 11:00 AM Orlando Prom, PT [facility]

## 2021-12-06 ENCOUNTER — APPOINTMENT (OUTPATIENT)
Dept: PHYSICAL THERAPY | Age: 21
End: 2021-12-06
Payer: MEDICAID

## 2022-07-20 ENCOUNTER — OFFICE VISIT (OUTPATIENT)
Dept: FAMILY MEDICINE CLINIC | Age: 22
End: 2022-07-20
Payer: MEDICAID

## 2022-07-20 VITALS
SYSTOLIC BLOOD PRESSURE: 110 MMHG | HEIGHT: 61 IN | HEART RATE: 82 BPM | DIASTOLIC BLOOD PRESSURE: 77 MMHG | OXYGEN SATURATION: 100 % | WEIGHT: 172.8 LBS | RESPIRATION RATE: 16 BRPM | TEMPERATURE: 97.2 F | BODY MASS INDEX: 32.62 KG/M2

## 2022-07-20 DIAGNOSIS — E88.81 INSULIN RESISTANCE: ICD-10-CM

## 2022-07-20 DIAGNOSIS — R73.03 PREDIABETES: ICD-10-CM

## 2022-07-20 DIAGNOSIS — E28.2 PCOS (POLYCYSTIC OVARIAN SYNDROME): Primary | ICD-10-CM

## 2022-07-20 DIAGNOSIS — Z23 ENCOUNTER FOR IMMUNIZATION: ICD-10-CM

## 2022-07-20 PROCEDURE — 99213 OFFICE O/P EST LOW 20 MIN: CPT | Performed by: STUDENT IN AN ORGANIZED HEALTH CARE EDUCATION/TRAINING PROGRAM

## 2022-07-20 PROCEDURE — 90649 4VHPV VACCINE 3 DOSE IM: CPT | Performed by: STUDENT IN AN ORGANIZED HEALTH CARE EDUCATION/TRAINING PROGRAM

## 2022-07-20 NOTE — PROGRESS NOTES
Laine Reed is a 20 y.o. presents today for   Chief Complaint   Patient presents with    Establish Care    Rash     Hives forearms/thighs since may       Menstrual Problem     7 days length, heavy flow and painful cramps for last 3 years        Is someone accompanying this pt? No    Is the patient using any DME equipment during OV? No    Visit Vitals  /77 (BP 1 Location: Left upper arm, BP Patient Position: Sitting, BP Cuff Size: Adult)   Pulse 82   Temp 97.2 °F (36.2 °C) (Temporal)   Resp 16   Ht 5' 1\" (1.549 m)   Wt 172 lb 12.8 oz (78.4 kg)   SpO2 100%   BMI 32.65 kg/m²       Depression Screening:   3 most recent PHQ Screens 7/20/2022   Little interest or pleasure in doing things Not at all   Feeling down, depressed, irritable, or hopeless Not at all   Total Score PHQ 2 0       Health Maintenance: reviewed and discussed and ordered per Provider. Health Maintenance Due   Topic Date Due    COVID-19 Vaccine (1) Never done    HPV Age 9Y-34Y (1 - 2-dose series) Never done         Coordination of Care:   1. \"Have you been to the ER, urgent care clinic since your last visit? Hospitalized since your last visit? \" Yes [facility] for chest pain last month     2. \"Have you seen or consulted any other health care providers outside of the [address] [place] [address] since your last visit? \" Yes, ObGyn     3. For patients aged 39-70: Has the patient had a colonoscopy / FIT/ Cologuard? NA - based on age    If the patient is female:    4. For patients aged 41-77: Has the patient had a mammogram within the past 2 years? NA - based on age or sex    11. For patients aged 21-65: Has the patient had a pap smear? No     Advanced Directive:  1. Do you have an Advanced Directive? No     2. Would you like information on Advanced Directives?  No    Abron Ebbs CMA

## 2022-07-20 NOTE — PROGRESS NOTES
Meredith Pathak is a 20 y.o.  female and presents with    Chief Complaint   Patient presents with    Establish Care    Rash     Hives forearms/thighs since may       Menstrual Problem     7 days length, heavy flow and painful cramps for last 3 years              Subjective:    Here to establish care    Went to [place] about 3 months ago. Got hives while there. She went to [facility],  she was given prednisone, and this seems to resolve. However, she feels they still comes around. Does not have them at this time. She does not feel like she is allergic. The hives are there but not itchy now, as opposed to before when they were itchy. Menstrual problems. Period has been irregular. Last menses prior to this one she states that there are gaps of 2 months in between her periods. She has gained weight recently. When she tries to bring the weight down, she does not feel that anything is working. She is exercising and changing her diet. Complains of hirsutism. There is no problem list on file for this patient. Past Medical History:   Diagnosis Date    Migraine       History reviewed. No pertinent surgical history.    Family History   Problem Relation Age of Onset    No Known Problems Mother     Stroke Father 36    Hypertension Father     Diabetes Father     No Known Problems Sister     No Known Problems Maternal Grandmother     No Known Problems Paternal Grandmother     Stroke Paternal Grandfather      Social History     Socioeconomic History    Marital status: SINGLE     Spouse name: Not on file    Number of children: Not on file    Years of education: Not on file    Highest education level: Not on file   Occupational History    Not on file   Tobacco Use    Smoking status: Never    Smokeless tobacco: Never   Substance and Sexual Activity    Alcohol use: Yes     Comment: socially    Drug use: No    Sexual activity: Yes     Partners: Male     Birth control/protection: Condom   Other Topics Concern Not on file   Social History Narrative    Not on file     Social Determinants of Health     Financial Resource Strain: Not on file   Food Insecurity: Not on file   Transportation Needs: Not on file   Physical Activity: Not on file   Stress: Not on file   Social Connections: Not on file   Intimate Partner Violence: Not on file   Housing Stability: Not on file        Current Outpatient Medications   Medication Sig Dispense Refill    minocycline (DYNACIN) 50 mg tablet       amitriptyline (ELAVIL) 10 mg tablet Take 1 Tablet by mouth nightly. (Patient not taking: Reported on 7/20/2022) 90 Tablet 1        ROS   Review of Systems   Constitutional:  Negative for chills, fever and malaise/fatigue. HENT:  Negative for congestion, ear discharge and ear pain. Eyes:  Negative for blurred vision, pain and discharge. Respiratory:  Negative for cough and shortness of breath. Cardiovascular:  Negative for chest pain and palpitations. Gastrointestinal:  Negative for abdominal pain, nausea and vomiting. Genitourinary:  Negative for dysuria, frequency and urgency. Skin:  Negative for itching and rash. Neurological:  Negative for dizziness, seizures, loss of consciousness and headaches. Psychiatric/Behavioral:  Negative for substance abuse. Objective:  Vitals:    07/20/22 0817   BP: 110/77   Pulse: 82   Resp: 16   Temp: 97.2 °F (36.2 °C)   TempSrc: Temporal   SpO2: 100%   Weight: 172 lb 12.8 oz (78.4 kg)   Height: 5' 1\" (1.549 m)   PainSc:   0 - No pain   LMP: 07/19/2022       Physical Exam  Vitals reviewed. Constitutional:       Appearance: Normal appearance. She is obese. Eyes:      General: No scleral icterus. Right eye: No discharge. Left eye: No discharge. Cardiovascular:      Rate and Rhythm: Normal rate and regular rhythm. Pulses: Normal pulses. Pulmonary:      Effort: Pulmonary effort is normal. No respiratory distress. Breath sounds: Normal breath sounds. Musculoskeletal:      Cervical back: Normal range of motion and neck supple. Skin:     Comments: Acanthosis nigricans over neck area     Neurological:      Mental Status: She is alert and oriented to person, place, and time. Cranial Nerves: No cranial nerve deficit. Psychiatric:         Mood and Affect: Mood normal.         Behavior: Behavior normal.         Thought Content: Thought content normal.         Judgment: Judgment normal.         LABS     TESTS      Assessment/Plan:    1. PCOS (polycystic ovarian syndrome)  Meets 2 out of 3 rotterdam criteria. Does not want OCP at this time. - TESTOSTERONE, FREE & TOTAL; Future    2. Prediabetes  - HEMOGLOBIN A1C WITH EAG; Future  - METABOLIC PANEL, COMPREHENSIVE; Future    3. Insulin resistance  - METABOLIC PANEL, COMPREHENSIVE; Future  - LIPID PANEL; Future  - CBC WITH AUTOMATED DIFF; Future    4. Encounter for immunization  HPV vaccine given in office today    Lab review: orders written for new lab studies as appropriate; see orders      I have discussed the diagnosis with the patient and the intended plan as seen in the above orders. The patient has received an after-visit summary and questions were answered concerning future plans. I have discussed medication side effects and warnings with the patient as well. I have reviewed the plan of care with the patient, accepted their input and they are in agreement with the treatment goals.          Stacie Leonard MD

## 2022-07-23 LAB
ALBUMIN SERPL-MCNC: 4.5 G/DL (ref 3.9–5)
ALBUMIN/GLOB SERPL: 1.9 {RATIO} (ref 1.2–2.2)
ALP SERPL-CCNC: 51 IU/L (ref 44–121)
ALT SERPL-CCNC: 10 IU/L (ref 0–32)
AST SERPL-CCNC: 15 IU/L (ref 0–40)
BASOPHILS # BLD AUTO: 0 X10E3/UL (ref 0–0.2)
BASOPHILS NFR BLD AUTO: 1 %
BILIRUB SERPL-MCNC: <0.2 MG/DL (ref 0–1.2)
BUN SERPL-MCNC: 7 MG/DL (ref 6–20)
BUN/CREAT SERPL: 9 (ref 9–23)
CALCIUM SERPL-MCNC: 9.4 MG/DL (ref 8.7–10.2)
CHLORIDE SERPL-SCNC: 103 MMOL/L (ref 96–106)
CHOLEST SERPL-MCNC: 150 MG/DL (ref 100–199)
CO2 SERPL-SCNC: 22 MMOL/L (ref 20–29)
CREAT SERPL-MCNC: 0.76 MG/DL (ref 0.57–1)
EGFR: 113 ML/MIN/1.73
EOSINOPHIL # BLD AUTO: 0.1 X10E3/UL (ref 0–0.4)
EOSINOPHIL NFR BLD AUTO: 2 %
ERYTHROCYTE [DISTWIDTH] IN BLOOD BY AUTOMATED COUNT: 15 % (ref 11.7–15.4)
EST. AVERAGE GLUCOSE BLD GHB EST-MCNC: 123 MG/DL
GLOBULIN SER CALC-MCNC: 2.4 G/DL (ref 1.5–4.5)
GLUCOSE SERPL-MCNC: 96 MG/DL (ref 65–99)
HBA1C MFR BLD: 5.9 % (ref 4.8–5.6)
HCT VFR BLD AUTO: 38.9 % (ref 34–46.6)
HDLC SERPL-MCNC: 47 MG/DL
HGB BLD-MCNC: 11.2 G/DL (ref 11.1–15.9)
IMM GRANULOCYTES # BLD AUTO: 0 X10E3/UL (ref 0–0.1)
IMM GRANULOCYTES NFR BLD AUTO: 0 %
IMP & REVIEW OF LAB RESULTS: NORMAL
LDLC SERPL CALC-MCNC: 91 MG/DL (ref 0–99)
LYMPHOCYTES # BLD AUTO: 2.3 X10E3/UL (ref 0.7–3.1)
LYMPHOCYTES NFR BLD AUTO: 42 %
MCH RBC QN AUTO: 20.7 PG (ref 26.6–33)
MCHC RBC AUTO-ENTMCNC: 28.8 G/DL (ref 31.5–35.7)
MCV RBC AUTO: 72 FL (ref 79–97)
MONOCYTES # BLD AUTO: 0.4 X10E3/UL (ref 0.1–0.9)
MONOCYTES NFR BLD AUTO: 7 %
NEUTROPHILS # BLD AUTO: 2.8 X10E3/UL (ref 1.4–7)
NEUTROPHILS NFR BLD AUTO: 48 %
PLATELET # BLD AUTO: 272 X10E3/UL (ref 150–450)
POTASSIUM SERPL-SCNC: 4.9 MMOL/L (ref 3.5–5.2)
PROT SERPL-MCNC: 6.9 G/DL (ref 6–8.5)
RBC # BLD AUTO: 5.42 X10E6/UL (ref 3.77–5.28)
SODIUM SERPL-SCNC: 140 MMOL/L (ref 134–144)
TESTOST FREE SERPL-MCNC: 2.3 PG/ML (ref 0–4.2)
TESTOST SERPL-MCNC: 61 NG/DL (ref 13–71)
TRIGL SERPL-MCNC: 56 MG/DL (ref 0–149)
VLDLC SERPL CALC-MCNC: 12 MG/DL (ref 5–40)
WBC # BLD AUTO: 5.6 X10E3/UL (ref 3.4–10.8)

## 2022-08-17 DIAGNOSIS — E66.9 OBESITY (BMI 30-39.9): Primary | ICD-10-CM

## 2022-08-26 ENCOUNTER — HOSPITAL ENCOUNTER (OUTPATIENT)
Dept: LAB | Age: 22
Discharge: HOME | End: 2022-08-26
Payer: MEDICAID

## 2022-08-26 DIAGNOSIS — E66.9 OBESITY (BMI 30-39.9): ICD-10-CM

## 2022-08-26 LAB
ATRIAL RATE: 80 BPM
CALCULATED P AXIS, ECG09: 60 DEGREES
CALCULATED R AXIS, ECG10: 73 DEGREES
CALCULATED T AXIS, ECG11: 40 DEGREES
DIAGNOSIS, 93000: NORMAL
P-R INTERVAL, ECG05: 150 MS
Q-T INTERVAL, ECG07: 366 MS
QRS DURATION, ECG06: 82 MS
QTC CALCULATION (BEZET), ECG08: 422 MS
VENTRICULAR RATE, ECG03: 80 BPM

## 2022-08-26 PROCEDURE — 93005 ELECTROCARDIOGRAM TRACING: CPT

## 2022-08-31 ENCOUNTER — OFFICE VISIT (OUTPATIENT)
Dept: FAMILY MEDICINE CLINIC | Age: 22
End: 2022-08-31
Payer: MEDICAID

## 2022-08-31 VITALS
RESPIRATION RATE: 16 BRPM | HEIGHT: 61 IN | HEART RATE: 74 BPM | TEMPERATURE: 97.8 F | WEIGHT: 171.6 LBS | OXYGEN SATURATION: 99 % | BODY MASS INDEX: 32.4 KG/M2 | SYSTOLIC BLOOD PRESSURE: 104 MMHG | DIASTOLIC BLOOD PRESSURE: 70 MMHG

## 2022-08-31 DIAGNOSIS — E66.09 CLASS 1 OBESITY DUE TO EXCESS CALORIES WITHOUT SERIOUS COMORBIDITY WITH BODY MASS INDEX (BMI) OF 32.0 TO 32.9 IN ADULT: Primary | ICD-10-CM

## 2022-08-31 LAB
HCG URINE, QL. (POC): NEGATIVE
VALID INTERNAL CONTROL?: YES

## 2022-08-31 PROCEDURE — 99214 OFFICE O/P EST MOD 30 MIN: CPT | Performed by: STUDENT IN AN ORGANIZED HEALTH CARE EDUCATION/TRAINING PROGRAM

## 2022-08-31 PROCEDURE — 81025 URINE PREGNANCY TEST: CPT | Performed by: STUDENT IN AN ORGANIZED HEALTH CARE EDUCATION/TRAINING PROGRAM

## 2022-08-31 RX ORDER — PHENTERMINE HYDROCHLORIDE 37.5 MG/1
37.5 TABLET ORAL
Qty: 30 TABLET | Refills: 0 | Status: SHIPPED | OUTPATIENT
Start: 2022-08-31 | End: 2022-09-28 | Stop reason: SDUPTHER

## 2022-08-31 NOTE — PROGRESS NOTES
David Alas is a 20 y.o.  female and presents with    Chief Complaint   Patient presents with    Follow-up    Results     EKG           Subjective:    Pt is here to f/up on EKG results. She would also want to start on the phentermine that we had discussed previously. States she is sexually active, but uses condoms. There is no problem list on file for this patient. Past Medical History:   Diagnosis Date    Migraine       History reviewed. No pertinent surgical history. Family History   Problem Relation Age of Onset    No Known Problems Mother     Stroke Father 36    Hypertension Father     Diabetes Father     No Known Problems Sister     No Known Problems Maternal Grandmother     No Known Problems Paternal Grandmother     Stroke Paternal Grandfather      Social History     Socioeconomic History    Marital status: SINGLE     Spouse name: Not on file    Number of children: Not on file    Years of education: Not on file    Highest education level: Not on file   Occupational History    Not on file   Tobacco Use    Smoking status: Never    Smokeless tobacco: Never   Substance and Sexual Activity    Alcohol use: Yes     Comment: socially    Drug use: No    Sexual activity: Yes     Partners: Male     Birth control/protection: Condom   Other Topics Concern    Not on file   Social History Narrative    Not on file     Social Determinants of Health     Financial Resource Strain: Not on file   Food Insecurity: Not on file   Transportation Needs: Not on file   Physical Activity: Not on file   Stress: Not on file   Social Connections: Not on file   Intimate Partner Violence: Not on file   Housing Stability: Not on file        Current Outpatient Medications   Medication Sig Dispense Refill    minocycline (DYNACIN) 50 mg tablet           ROS   Review of Systems   Constitutional:  Negative for chills, fever and malaise/fatigue. HENT:  Negative for congestion, ear discharge and ear pain.     Eyes:  Negative for blurred vision, pain and discharge. Respiratory:  Negative for cough and shortness of breath. Cardiovascular:  Negative for chest pain and palpitations. Gastrointestinal:  Negative for abdominal pain, nausea and vomiting. Genitourinary:  Negative for dysuria, frequency and urgency. Skin:  Negative for itching and rash. Neurological:  Negative for dizziness, seizures, loss of consciousness and headaches. Psychiatric/Behavioral:  Negative for substance abuse. Objective:  Vitals:    08/31/22 0822   BP: 104/70   Pulse: 74   Resp: 16   Temp: 97.8 °F (36.6 °C)   TempSrc: Temporal   SpO2: 99%   Weight: 171 lb 9.6 oz (77.8 kg)   Height: 5' 1\" (1.549 m)   PainSc:   0 - No pain       Physical Exam  Vitals reviewed. Constitutional:       Appearance: Normal appearance. She is obese. Eyes:      General: No scleral icterus. Right eye: No discharge. Left eye: No discharge. Cardiovascular:      Rate and Rhythm: Normal rate and regular rhythm. Pulses: Normal pulses. Pulmonary:      Effort: Pulmonary effort is normal. No respiratory distress. Breath sounds: Normal breath sounds. Musculoskeletal:         General: Normal range of motion. Cervical back: Normal range of motion and neck supple. Neurological:      Mental Status: She is alert and oriented to person, place, and time. Cranial Nerves: No cranial nerve deficit. Psychiatric:         Mood and Affect: Mood normal.         Behavior: Behavior normal.         Thought Content: Thought content normal.         Judgment: Judgment normal.         LABS     TESTS      Assessment/Plan:    1. Class 1 obesity due to excess calories without serious comorbidity with body mass index (BMI) of 32.0 to 32.9 in adult  EKG was reviewed and normal. Discussed w/ patient that do not get pregnant while she is on this medication d/t possible teratogenic effects.   Discussed with patient to take half a pill for the first week, and then she can go up to 1 pill. However if she feels that this is too strong she can stay in half a pill only. Discussed with patient to drink plenty of water, eat healthy, and exercise. - AMB POC URINE PREGNANCY TEST, VISUAL COLOR COMPARISON  - phentermine (ADIPEX-P) 37.5 mg tablet; Take 1 Tablet by mouth every morning. Max Daily Amount: 37.5 mg. Indications: weight loss management for an obese person  Dispense: 30 Tablet; Refill: 0      Lab review: no lab studies available for review at time of visit      I have discussed the diagnosis with the patient and the intended plan as seen in the above orders. The patient has received an after-visit summary and questions were answered concerning future plans. I have discussed medication side effects and warnings with the patient as well. I have reviewed the plan of care with the patient, accepted their input and they are in agreement with the treatment goals.          Aline Davies MD

## 2022-08-31 NOTE — PROGRESS NOTES
Calixto Wise is a 20 y.o. presents today for   Chief Complaint   Patient presents with    Follow-up    Results     EKG     Is someone accompanying this pt? No    Is the patient using any DME equipment during OV? No    Visit Vitals  /70 (BP 1 Location: Left upper arm, BP Patient Position: Sitting, BP Cuff Size: Adult)   Pulse 74   Temp 97.8 °F (36.6 °C) (Temporal)   Resp 16   Ht 5' 1\" (1.549 m)   Wt 171 lb 9.6 oz (77.8 kg)   SpO2 99%   BMI 32.42 kg/m²       Depression Screening:   3 most recent PHQ Screens 8/31/2022   Little interest or pleasure in doing things Not at all   Feeling down, depressed, irritable, or hopeless Not at all   Total Score PHQ 2 0       Health Maintenance: reviewed and discussed and ordered per Provider. Health Maintenance Due   Topic Date Due    COVID-19 Vaccine (1) Never done    HPV Age 9Y-34Y (2 - 3-dose series) 08/17/2022         Coordination of Care:   1. \"Have you been to the ER, urgent care clinic since your last visit? Hospitalized since your last visit? \" No    2. \"Have you seen or consulted any other health care providers outside of the [address] [place] [address] since your last visit? \" No     3. For patients aged 39-70: Has the patient had a colonoscopy / FIT/ Cologuard? NA - based on age    If the patient is female:    4. For patients aged 41-77: Has the patient had a mammogram within the past 2 years? NA - based on age or sex    11. For patients aged 21-65: Has the patient had a pap smear?  Yes - no Care Gap present     Jude Manuel

## 2022-09-28 ENCOUNTER — OFFICE VISIT (OUTPATIENT)
Dept: FAMILY MEDICINE CLINIC | Age: 22
End: 2022-09-28
Payer: MEDICAID

## 2022-09-28 VITALS
HEART RATE: 76 BPM | BODY MASS INDEX: 30.4 KG/M2 | DIASTOLIC BLOOD PRESSURE: 72 MMHG | HEIGHT: 61 IN | TEMPERATURE: 97.7 F | WEIGHT: 161 LBS | RESPIRATION RATE: 16 BRPM | OXYGEN SATURATION: 99 % | SYSTOLIC BLOOD PRESSURE: 102 MMHG

## 2022-09-28 DIAGNOSIS — E66.09 CLASS 1 OBESITY DUE TO EXCESS CALORIES WITHOUT SERIOUS COMORBIDITY WITH BODY MASS INDEX (BMI) OF 32.0 TO 32.9 IN ADULT: ICD-10-CM

## 2022-09-28 DIAGNOSIS — R10.13 DYSPEPSIA: Primary | ICD-10-CM

## 2022-09-28 PROCEDURE — 99214 OFFICE O/P EST MOD 30 MIN: CPT | Performed by: STUDENT IN AN ORGANIZED HEALTH CARE EDUCATION/TRAINING PROGRAM

## 2022-09-28 RX ORDER — PHENTERMINE HYDROCHLORIDE 37.5 MG/1
37.5 TABLET ORAL
Qty: 30 TABLET | Refills: 0 | Status: SHIPPED | OUTPATIENT
Start: 2022-09-28 | End: 2022-10-21 | Stop reason: SDUPTHER

## 2022-09-28 NOTE — PROGRESS NOTES
Augustin Alatorre is a 20 y.o.  female and presents with    Chief Complaint   Patient presents with    Weight Management     10lbs for a month     Heartburn     Several days, no vomiting or nausea             Subjective:    Heartburn - once a week. Last about the whole day even into the night. She does not take medication for it, she just drink water and tries to relax. It usually starts after eating. Patient has been taking half of phentermine pill. States that she has been drinking plenty of water. She did feel 1 day that her heart rate went slightly up, however she has been tolerating the prescription well. States that she did exercise for 1 week, however she has not been exercising since then. Would like to have a refill on the prescription today. There is no problem list on file for this patient. Past Medical History:   Diagnosis Date    Migraine       History reviewed. No pertinent surgical history.    Family History   Problem Relation Age of Onset    No Known Problems Mother     Stroke Father 36    Hypertension Father     Diabetes Father     No Known Problems Sister     No Known Problems Maternal Grandmother     No Known Problems Paternal Grandmother     Stroke Paternal Grandfather      Social History     Socioeconomic History    Marital status: SINGLE     Spouse name: Not on file    Number of children: Not on file    Years of education: Not on file    Highest education level: Not on file   Occupational History    Not on file   Tobacco Use    Smoking status: Never    Smokeless tobacco: Never   Substance and Sexual Activity    Alcohol use: Yes     Comment: socially    Drug use: No    Sexual activity: Yes     Partners: Male     Birth control/protection: Condom   Other Topics Concern    Not on file   Social History Narrative    Not on file     Social Determinants of Health     Financial Resource Strain: Not on file   Food Insecurity: Not on file   Transportation Needs: Not on file   Physical Activity: Not on file   Stress: Not on file   Social Connections: Not on file   Intimate Partner Violence: Not on file   Housing Stability: Not on file        Current Outpatient Medications   Medication Sig Dispense Refill    phentermine (ADIPEX-P) 37.5 mg tablet Take 1 Tablet by mouth every morning. Max Daily Amount: 37.5 mg. Indications: weight loss management for an obese person 30 Tablet 0    minocycline (DYNACIN) 50 mg tablet           ROS   Review of Systems   Constitutional:  Negative for chills, fever and malaise/fatigue. HENT:  Negative for congestion, ear discharge and ear pain. Eyes:  Negative for blurred vision, pain and discharge. Respiratory:  Negative for cough and shortness of breath. Cardiovascular:  Negative for chest pain and palpitations. Gastrointestinal:  Negative for abdominal pain, nausea and vomiting. Genitourinary:  Negative for dysuria, frequency and urgency. Skin:  Negative for itching and rash. Neurological:  Negative for dizziness, seizures, loss of consciousness and headaches. Psychiatric/Behavioral:  Negative for substance abuse. Objective:  Vitals:    09/28/22 0933   BP: 102/72   Pulse: 76   Resp: 16   Temp: 97.7 °F (36.5 °C)   TempSrc: Temporal   SpO2: 99%   Weight: 161 lb (73 kg)   Height: 5' 1\" (1.549 m)   PainSc:   0 - No pain   LMP: 08/28/2022       Physical Exam  Vitals reviewed. Constitutional:       Appearance: Normal appearance. Eyes:      General: No scleral icterus. Right eye: No discharge. Left eye: No discharge. Cardiovascular:      Rate and Rhythm: Normal rate and regular rhythm. Pulmonary:      Effort: Pulmonary effort is normal. No respiratory distress. Breath sounds: Normal breath sounds. Musculoskeletal:         General: Normal range of motion. Cervical back: Normal range of motion and neck supple. Skin:     General: Skin is warm.    Neurological:      Mental Status: She is alert and oriented to person, place, and time. Cranial Nerves: No cranial nerve deficit. Psychiatric:         Mood and Affect: Mood normal.         Behavior: Behavior normal.         Thought Content: Thought content normal.         Judgment: Judgment normal.         LABS     TESTS      Assessment/Plan:    1. Class 1 obesity due to excess calories without serious comorbidity with body mass index (BMI) of 32.0 to 32.9 in adult  Discussed the importance of exercise. Discussed importance of staying well-hydrated in order to not have heart rate increase.  - phentermine (ADIPEX-P) 37.5 mg tablet; Take 1 Tablet by mouth every morning. Max Daily Amount: 37.5 mg. Indications: weight loss management for an obese person  Dispense: 30 Tablet; Refill: 0    2. Dyspepsia  Gave patient education on foods that can cause dyspepsia. Discussed with she can take Tums if she is having the symptoms. Lab review: no lab studies available for review at time of visit      I have discussed the diagnosis with the patient and the intended plan as seen in the above orders. The patient has received an after-visit summary and questions were answered concerning future plans. I have discussed medication side effects and warnings with the patient as well. I have reviewed the plan of care with the patient, accepted their input and they are in agreement with the treatment goals.          Benito Holstein, MD

## 2022-09-28 NOTE — PROGRESS NOTES
Alyssa Simpson is a 20 y.o. presents today for   Chief Complaint   Patient presents with    Weight Management     10lbs for a month     Heartburn     Several days, no vomiting or nausea       Is someone accompanying this pt? No    Is the patient using any DME equipment during OV? No    Visit Vitals  /72 (BP 1 Location: Left upper arm, BP Patient Position: Sitting, BP Cuff Size: Adult)   Pulse 76   Temp 97.7 °F (36.5 °C) (Temporal)   Resp 16   Ht 5' 1\" (1.549 m)   Wt 161 lb (73 kg)   SpO2 99%   BMI 30.42 kg/m²       Depression Screening:   3 most recent PHQ Screens 9/28/2022   Little interest or pleasure in doing things Not at all   Feeling down, depressed, irritable, or hopeless Not at all   Total Score PHQ 2 0       Health Maintenance: reviewed and discussed and ordered per Provider. Health Maintenance Due   Topic Date Due    COVID-19 Vaccine (1) Never done    Flu Vaccine (1) 08/01/2022    HPV Age 9Y-34Y (2 - 3-dose series) 08/17/2022         Coordination of Care:   1. \"Have you been to the ER, urgent care clinic since your last visit? Hospitalized since your last visit? \" No    2. \"Have you seen or consulted any other health care providers outside of the [address] [place] [address] since your last visit? \" No     3. For patients aged 39-70: Has the patient had a colonoscopy / FIT/ Cologuard? NA - based on age    If the patient is female:    4. For patients aged 41-77: Has the patient had a mammogram within the past 2 years? NA - based on age or sex    11. For patients aged 21-65: Has the patient had a pap smear?  Yes - no Care Gap present     [address] [place] [address]

## 2022-10-21 ENCOUNTER — OFFICE VISIT (OUTPATIENT)
Dept: FAMILY MEDICINE CLINIC | Age: 22
End: 2022-10-21
Payer: MEDICAID

## 2022-10-21 VITALS
TEMPERATURE: 97.7 F | RESPIRATION RATE: 16 BRPM | SYSTOLIC BLOOD PRESSURE: 108 MMHG | HEART RATE: 93 BPM | BODY MASS INDEX: 29.98 KG/M2 | DIASTOLIC BLOOD PRESSURE: 74 MMHG | OXYGEN SATURATION: 100 % | WEIGHT: 158.8 LBS | HEIGHT: 61 IN

## 2022-10-21 DIAGNOSIS — E66.09 CLASS 1 OBESITY DUE TO EXCESS CALORIES WITHOUT SERIOUS COMORBIDITY WITH BODY MASS INDEX (BMI) OF 30.0 TO 30.9 IN ADULT: ICD-10-CM

## 2022-10-21 DIAGNOSIS — K59.03 DRUG-INDUCED CONSTIPATION: Primary | ICD-10-CM

## 2022-10-21 PROCEDURE — 99214 OFFICE O/P EST MOD 30 MIN: CPT | Performed by: STUDENT IN AN ORGANIZED HEALTH CARE EDUCATION/TRAINING PROGRAM

## 2022-10-21 RX ORDER — PHENTERMINE HYDROCHLORIDE 37.5 MG/1
37.5 TABLET ORAL
Qty: 30 TABLET | Refills: 0 | Status: SHIPPED | OUTPATIENT
Start: 2022-10-21

## 2022-10-21 NOTE — PROGRESS NOTES
Dawood Chiu is a 20 y.o.  female and presents with    Chief Complaint   Patient presents with    Weight Management     Refill phentermine     Constipation     For a weak w/o bowel movement            Subjective:      Pt has been taking phentermine. She states she has been hydrating, she has also started exercising on the weekends. Started taking a full pill. Has been constipated, for the last 3 days. She is passing gas. Denies abdominal pain. Has been trying to do prune juice to improve with the constipation. There is no problem list on file for this patient. Past Medical History:   Diagnosis Date    Migraine       History reviewed. No pertinent surgical history.    Family History   Problem Relation Age of Onset    No Known Problems Mother     Stroke Father 36    Hypertension Father     Diabetes Father     No Known Problems Sister     No Known Problems Maternal Grandmother     No Known Problems Paternal Grandmother     Stroke Paternal Grandfather      Social History     Socioeconomic History    Marital status: SINGLE     Spouse name: Not on file    Number of children: Not on file    Years of education: Not on file    Highest education level: Not on file   Occupational History    Not on file   Tobacco Use    Smoking status: Never    Smokeless tobacco: Never   Substance and Sexual Activity    Alcohol use: Yes     Comment: socially    Drug use: No    Sexual activity: Yes     Partners: Male     Birth control/protection: Condom   Other Topics Concern    Not on file   Social History Narrative    Not on file     Social Determinants of Health     Financial Resource Strain: Not on file   Food Insecurity: Not on file   Transportation Needs: Not on file   Physical Activity: Not on file   Stress: Not on file   Social Connections: Not on file   Intimate Partner Violence: Not on file   Housing Stability: Not on file        Current Outpatient Medications   Medication Sig Dispense Refill    phentermine (ADIPEX-P) 37.5 mg tablet Take 1 Tablet by mouth every morning. Max Daily Amount: 37.5 mg. Indications: weight loss management for an obese person 30 Tablet 0    minocycline (DYNACIN) 50 mg tablet           ROS   Review of Systems   Constitutional:  Negative for chills, fever and malaise/fatigue. HENT:  Negative for congestion, ear discharge and ear pain. Eyes:  Negative for blurred vision, pain and discharge. Respiratory:  Negative for cough and shortness of breath. Cardiovascular:  Negative for chest pain and palpitations. Gastrointestinal:  Negative for abdominal pain, nausea and vomiting. Genitourinary:  Negative for dysuria, frequency and urgency. Skin:  Negative for itching and rash. Neurological:  Negative for dizziness, seizures, loss of consciousness and headaches. Psychiatric/Behavioral:  Negative for substance abuse. Objective:  Vitals:    10/21/22 0852   BP: 108/74   Pulse: 93   Resp: 16   Temp: 97.7 °F (36.5 °C)   TempSrc: Temporal   SpO2: 100%   Weight: 158 lb 12.8 oz (72 kg)   Height: 5' 1\" (1.549 m)   PainSc:   0 - No pain   LMP: 09/28/2022       Physical Exam  Vitals reviewed. Constitutional:       Appearance: Normal appearance. Eyes:      General: No scleral icterus. Right eye: No discharge. Left eye: No discharge. Cardiovascular:      Rate and Rhythm: Normal rate and regular rhythm. Pulmonary:      Effort: Pulmonary effort is normal. No respiratory distress. Breath sounds: Normal breath sounds. Abdominal:      General: There is no distension. Palpations: Abdomen is soft. Tenderness: There is no abdominal tenderness. Musculoskeletal:      Cervical back: Normal range of motion and neck supple. Neurological:      Mental Status: She is alert and oriented to person, place, and time. Cranial Nerves: No cranial nerve deficit.    Psychiatric:         Mood and Affect: Mood normal.         Behavior: Behavior normal.         Thought Content: Thought content normal.         Judgment: Judgment normal.         LABS     TESTS      Assessment/Plan:    1. Class 1 obesity due to excess calories without serious comorbidity with body mass index (BMI) of 30.0 to 30.9 in adult  - phentermine (ADIPEX-P) 37.5 mg tablet; Take 1 Tablet by mouth every morning. Max Daily Amount: 37.5 mg. Indications: weight loss management for an obese person  Dispense: 30 Tablet; Refill: 0    2. Drug-induced constipation  Discussed w/ pt this is due from phentermine. Luis first try miralax. If this does not help then she is to cut pill in half. IF still no bowel movement then she is to discontinue the pill. Lab review: no lab studies available for review at time of visit      I have discussed the diagnosis with the patient and the intended plan as seen in the above orders. The patient has received an after-visit summary and questions were answered concerning future plans. I have discussed medication side effects and warnings with the patient as well. I have reviewed the plan of care with the patient, accepted their input and they are in agreement with the treatment goals.          Linnea Jenkins MD

## 2022-10-21 NOTE — PROGRESS NOTES
Linnea Mott is a 20 y.o. presents today for   Chief Complaint   Patient presents with    Weight Management     Refill phentermine     Constipation     For a weak w/o bowel movement      Is someone accompanying this pt? No    Is the patient using any DME equipment during OV? No    Visit Vitals  /74 (BP 1 Location: Left upper arm, BP Patient Position: Sitting, BP Cuff Size: Adult)   Pulse 93   Temp 97.7 °F (36.5 °C) (Temporal)   Resp 16   Ht 5' 1\" (1.549 m)   Wt 158 lb 12.8 oz (72 kg)   SpO2 100%   BMI 30.00 kg/m²       Depression Screening:   3 most recent PHQ Screens 10/21/2022   Little interest or pleasure in doing things Not at all   Feeling down, depressed, irritable, or hopeless Not at all   Total Score PHQ 2 0       Health Maintenance: reviewed and discussed and ordered per Provider. Health Maintenance Due   Topic Date Due    COVID-19 Vaccine (1) Never done    Flu Vaccine (1) 08/01/2022    HPV Age 9Y-34Y (2 - 3-dose series) 08/17/2022    Medicare Yearly Exam  09/28/2022         Coordination of Care:   1. \"Have you been to the ER, urgent care clinic since your last visit? Hospitalized since your last visit? \" No    2. \"Have you seen or consulted any other health care providers outside of the [address] [place] [address] since your last visit? \" No     3. For patients aged 39-70: Has the patient had a colonoscopy / FIT/ Cologuard? NA - based on age    If the patient is female:    4. For patients aged 41-77: Has the patient had a mammogram within the past 2 years? NA - based on age or sex    11. For patients aged 21-65: Has the patient had a pap smear?  Yes - no Care Gap present     [address] [place] [address]

## 2022-11-21 ENCOUNTER — OFFICE VISIT (OUTPATIENT)
Dept: FAMILY MEDICINE CLINIC | Age: 22
End: 2022-11-21
Payer: MEDICAID

## 2022-11-21 VITALS
TEMPERATURE: 97.2 F | OXYGEN SATURATION: 100 % | SYSTOLIC BLOOD PRESSURE: 110 MMHG | RESPIRATION RATE: 16 BRPM | BODY MASS INDEX: 29.83 KG/M2 | HEART RATE: 89 BPM | HEIGHT: 61 IN | DIASTOLIC BLOOD PRESSURE: 75 MMHG | WEIGHT: 158 LBS

## 2022-11-21 DIAGNOSIS — K59.03 DRUG-INDUCED CONSTIPATION: Primary | ICD-10-CM

## 2022-11-21 PROCEDURE — 99213 OFFICE O/P EST LOW 20 MIN: CPT | Performed by: STUDENT IN AN ORGANIZED HEALTH CARE EDUCATION/TRAINING PROGRAM

## 2022-11-21 RX ORDER — FACIAL-BODY WIPES
10 EACH TOPICAL DAILY
Qty: 4 EACH | Refills: 0 | Status: SHIPPED | OUTPATIENT
Start: 2022-11-21

## 2022-11-21 NOTE — PROGRESS NOTES
Cassandra Gordon is a 20 y.o. presents today for   Chief Complaint   Patient presents with    Menstrual Problem     Twice this month 6 days apart    Constipation     For 6 days tried OTC      Is someone accompanying this pt? No    Is the patient using any DME equipment during OV? No      Depression Screening:   3 most recent PHQ Screens 11/21/2022   Little interest or pleasure in doing things Not at all   Feeling down, depressed, irritable, or hopeless Not at all   Total Score PHQ 2 0       Health Maintenance: reviewed and discussed and ordered per Provider. Health Maintenance Due   Topic Date Due    COVID-19 Vaccine (1) Never done    Flu Vaccine (1) 08/01/2022    HPV Age 9Y-34Y (2 - 3-dose series) 08/17/2022         Coordination of Care:   1. \"Have you been to the ER, urgent care clinic since your last visit? Hospitalized since your last visit? \" No    2. \"Have you seen or consulted any other health care providers outside of the [address] [place] [address] since your last visit? \" No     3. For patients aged 39-70: Has the patient had a colonoscopy / FIT/ Cologuard? NA - based on age    If the patient is female:    4. For patients aged 41-77: Has the patient had a mammogram within the past 2 years? NA - based on age or sex    11. For patients aged 21-65: Has the patient had a pap smear?  Yes - no Care Gap present     [address] [place] [address]

## 2022-11-21 NOTE — PROGRESS NOTES
Elieser Florez is a 20 y.o.  female and presents with    Chief Complaint   Patient presents with    Menstrual Problem     Twice this month 6 days apart    Constipation     For 6 days tried OTC            Subjective:    Pt was taking phentermine. Last time she took it was 4 days ago. She states she has been having constipation. States last time she had a bowel movement was 1 week ago. She tried Mirlax 5 days ago. Did one dose, then nothing 2 days after that. Then 3 days ago did  2 doses of Miralax, and then yesterday morning tried a laxative. Passing gas. She had some pain 5 days ago, but it went away. Not uncomfortable at this point. Eating about once a day. Has not tried prunes. Has been having issue w/ her menses she had this month happened twice w/in 6 days apart. Both time she had it was like it was a whole menses. The first one laste 6-7 days which is the usual days and the second one was about 5 days. Has never had this happened to her before. There is no problem list on file for this patient. Past Medical History:   Diagnosis Date    Migraine       History reviewed. No pertinent surgical history.    Family History   Problem Relation Age of Onset    No Known Problems Mother     Stroke Father 36    Hypertension Father     Diabetes Father     No Known Problems Sister     No Known Problems Maternal Grandmother     No Known Problems Paternal Grandmother     Stroke Paternal Grandfather      Social History     Socioeconomic History    Marital status: SINGLE     Spouse name: Not on file    Number of children: Not on file    Years of education: Not on file    Highest education level: Not on file   Occupational History    Not on file   Tobacco Use    Smoking status: Never    Smokeless tobacco: Never   Substance and Sexual Activity    Alcohol use: Yes     Comment: socially    Drug use: No    Sexual activity: Yes     Partners: Male     Birth control/protection: Condom   Other Topics Concern    Not on file Social History Narrative    Not on file     Social Determinants of Health     Financial Resource Strain: Not on file   Food Insecurity: Not on file   Transportation Needs: Not on file   Physical Activity: Not on file   Stress: Not on file   Social Connections: Not on file   Intimate Partner Violence: Not on file   Housing Stability: Not on file        Current Outpatient Medications   Medication Sig Dispense Refill    phentermine (ADIPEX-P) 37.5 mg tablet Take 1 Tablet by mouth every morning. Max Daily Amount: 37.5 mg. Indications: weight loss management for an obese person 30 Tablet 0    minocycline (DYNACIN) 50 mg tablet           ROS   Review of Systems   Constitutional:  Negative for chills, fever and malaise/fatigue. HENT:  Negative for congestion, ear discharge and ear pain. Eyes:  Negative for blurred vision, pain and discharge. Respiratory:  Negative for cough and shortness of breath. Cardiovascular:  Negative for chest pain and palpitations. Gastrointestinal:  Positive for constipation. Negative for abdominal pain, nausea and vomiting. Genitourinary:  Negative for dysuria, frequency and urgency. Skin:  Negative for itching and rash. Neurological:  Negative for dizziness, seizures, loss of consciousness and headaches. Psychiatric/Behavioral:  Negative for substance abuse. Objective:  Vitals:    11/21/22 0822   BP: 110/75   Pulse: 89   Resp: 16   Temp: 97.2 °F (36.2 °C)   TempSrc: Temporal   SpO2: 100%   Weight: 158 lb (71.7 kg)   Height: 5' 1\" (1.549 m)   PainSc:   0 - No pain   LMP: 11/06/2022       Physical Exam  Vitals reviewed. Constitutional:       Appearance: Normal appearance. Eyes:      General: No scleral icterus. Right eye: No discharge. Left eye: No discharge. Cardiovascular:      Rate and Rhythm: Normal rate and regular rhythm. Pulmonary:      Effort: Pulmonary effort is normal. No respiratory distress. Breath sounds: Normal breath sounds. Abdominal:      General: Abdomen is flat. Bowel sounds are normal. There is no distension. Palpations: Abdomen is soft. There is no mass. Tenderness: There is no abdominal tenderness. There is no guarding or rebound. Hernia: No hernia is present. Musculoskeletal:      Cervical back: Normal range of motion and neck supple. Neurological:      Mental Status: She is alert and oriented to person, place, and time. Cranial Nerves: No cranial nerve deficit. Psychiatric:         Mood and Affect: Mood normal.         Behavior: Behavior normal.         Thought Content: Thought content normal.         Judgment: Judgment normal.         LABS     TESTS      Assessment/Plan:    1. Drug-induced constipation  Discussed drinking plenty of water, and also eating prunes. - bisacodyL (DULCOLAX) 10 mg supp; Insert 10 mg into rectum daily. Dispense: 4 Each; Refill: 0      Lab review: no lab studies available for review at time of visit      I have discussed the diagnosis with the patient and the intended plan as seen in the above orders. The patient has received an after-visit summary and questions were answered concerning future plans. I have discussed medication side effects and warnings with the patient as well. I have reviewed the plan of care with the patient, accepted their input and they are in agreement with the treatment goals.          Anny Adams MD

## 2023-09-15 ENCOUNTER — OFFICE VISIT (OUTPATIENT)
Facility: CLINIC | Age: 23
End: 2023-09-15
Payer: COMMERCIAL

## 2023-09-15 VITALS
BODY MASS INDEX: 31.83 KG/M2 | TEMPERATURE: 98.1 F | WEIGHT: 168.6 LBS | OXYGEN SATURATION: 100 % | DIASTOLIC BLOOD PRESSURE: 63 MMHG | RESPIRATION RATE: 16 BRPM | SYSTOLIC BLOOD PRESSURE: 99 MMHG | HEIGHT: 61 IN | HEART RATE: 73 BPM

## 2023-09-15 DIAGNOSIS — L85.8 KERATOSIS PILARIS: ICD-10-CM

## 2023-09-15 DIAGNOSIS — N89.8 VAGINAL DISCHARGE: ICD-10-CM

## 2023-09-15 DIAGNOSIS — R73.03 PREDIABETES: ICD-10-CM

## 2023-09-15 DIAGNOSIS — N75.0 BARTHOLIN CYST: Primary | ICD-10-CM

## 2023-09-15 PROCEDURE — 99214 OFFICE O/P EST MOD 30 MIN: CPT | Performed by: STUDENT IN AN ORGANIZED HEALTH CARE EDUCATION/TRAINING PROGRAM

## 2023-09-15 RX ORDER — FLUCONAZOLE 150 MG/1
150 TABLET ORAL ONCE
Qty: 1 TABLET | Refills: 0 | Status: SHIPPED | OUTPATIENT
Start: 2023-09-15 | End: 2023-09-15

## 2023-09-15 SDOH — ECONOMIC STABILITY: INCOME INSECURITY: HOW HARD IS IT FOR YOU TO PAY FOR THE VERY BASICS LIKE FOOD, HOUSING, MEDICAL CARE, AND HEATING?: PATIENT DECLINED

## 2023-09-15 SDOH — ECONOMIC STABILITY: FOOD INSECURITY: WITHIN THE PAST 12 MONTHS, THE FOOD YOU BOUGHT JUST DIDN'T LAST AND YOU DIDN'T HAVE MONEY TO GET MORE.: PATIENT DECLINED

## 2023-09-15 SDOH — ECONOMIC STABILITY: HOUSING INSECURITY
IN THE LAST 12 MONTHS, WAS THERE A TIME WHEN YOU DID NOT HAVE A STEADY PLACE TO SLEEP OR SLEPT IN A SHELTER (INCLUDING NOW)?: PATIENT REFUSED

## 2023-09-15 SDOH — ECONOMIC STABILITY: FOOD INSECURITY: WITHIN THE PAST 12 MONTHS, YOU WORRIED THAT YOUR FOOD WOULD RUN OUT BEFORE YOU GOT MONEY TO BUY MORE.: PATIENT DECLINED

## 2023-09-15 ASSESSMENT — PATIENT HEALTH QUESTIONNAIRE - PHQ9
2. FEELING DOWN, DEPRESSED OR HOPELESS: 0
SUM OF ALL RESPONSES TO PHQ QUESTIONS 1-9: 0
SUM OF ALL RESPONSES TO PHQ9 QUESTIONS 1 & 2: 0
1. LITTLE INTEREST OR PLEASURE IN DOING THINGS: 0

## 2023-09-15 ASSESSMENT — ENCOUNTER SYMPTOMS
FACIAL SWELLING: 0
EYE REDNESS: 0
BACK PAIN: 0
DIARRHEA: 0
EYE ITCHING: 0
EYE DISCHARGE: 0
ABDOMINAL PAIN: 0
SHORTNESS OF BREATH: 0
EYE PAIN: 0
VOMITING: 0
COLOR CHANGE: 0
CONSTIPATION: 0

## 2023-09-15 NOTE — PROGRESS NOTES
Yazan Figueroa is a 24 y.o. presents today for   Chief Complaint   Patient presents with    Vaginal Discharge     White sticky discharge W/a lump on her labia for a week      Is someone accompanying this pt? No     Is the patient using any DME equipment during OV? No     Health Maintenance Due   Topic Date Due    Hepatitis B vaccine (1 of 3 - 3-dose series) Never done    COVID-19 Vaccine (1) Never done    Varicella vaccine (1 of 2 - 2-dose childhood series) Never done    Chlamydia/GC screen  Never done    DTaP/Tdap/Td vaccine (1 - Tdap) Never done    HPV vaccine (2 - 3-dose series) 08/17/2022    A1C test (Diabetic or Prediabetic)  07/20/2023    Flu vaccine (1) 08/01/2023         Coordination of Care:   1. \"Have you been to the ER, urgent care clinic since your last visit? Hospitalized since your last visit? \" Yes ER for abdominal pain in march  and urgent care for rash     2. \"Have you seen or consulted any other health care providers outside of the [address] [place] [address] since your last visit? \" No     3. For patients aged 43-73: Has the patient had a colonoscopy / FIT/ Cologuard? NA - based on age      If the patient is female:    4. For patients aged 43-66: Has the patient had a mammogram within the past 2 years? NA - based on age or sex      11. For patients aged 21-65: Has the patient had a pap smear?  Yes - no Care Gap present    Carlos Sawyer
Temporal   SpO2: 100%   Weight: 168 lb 9.6 oz (76.5 kg)   Height: 5' 1\" (1.549 m)         Physical Exam  Vitals reviewed. Constitutional:       Appearance: Normal appearance. She is normal weight. HENT:      Head: Normocephalic. Nose: No congestion or rhinorrhea. Eyes:      General: No scleral icterus. Right eye: No discharge. Left eye: No discharge. Cardiovascular:      Rate and Rhythm: Normal rate and regular rhythm. Pulmonary:      Effort: Pulmonary effort is normal.      Breath sounds: Normal breath sounds. Abdominal:      General: Abdomen is flat. Palpations: Abdomen is soft. Genitourinary:     Pubic Area: No rash or pubic lice. Labia:         Right: Lesion (there is a 2 cm cyst. Soft to palpation. Non-tender) present. No rash, tenderness or injury. Left: No rash, tenderness, lesion or injury. Vagina: Vaginal discharge present. Cervix: Normal.   Musculoskeletal:         General: Normal range of motion. Cervical back: Normal range of motion and neck supple. No rigidity. Skin:     General: Skin is warm and dry. Neurological:      Mental Status: She is alert and oriented to person, place, and time. Gait: Gait normal.   Psychiatric:         Mood and Affect: Mood normal.         Behavior: Behavior normal.         Thought Content: Thought content normal.         Judgment: Judgment normal.           LABS     TESTS      Assessment/Plan:    1. Bartholin cyst  Discussed Epsom salt baths, and if there is any worsening symptoms or it becomes very painful the patient is to return to get this checked. 2. Vaginal discharge  - NuSwab Vaginitis Plus (VG+) with Candida (Six Species); Future  - fluconazole (DIFLUCAN) 150 MG tablet; Take 1 tablet by mouth once for 1 dose  Dispense: 1 tablet; Refill: 0    3. Prediabetes  - HEMOGLOBIN A1C W/O EAG; Future  - Comprehensive Metabolic Panel; Future    4.  Keratosis pilaris      Lab review: orders written for

## 2023-09-20 ENCOUNTER — TELEPHONE (OUTPATIENT)
Facility: CLINIC | Age: 23
End: 2023-09-20

## 2023-09-20 NOTE — TELEPHONE ENCOUNTER
Patient states she was informed by Dr. Papi Rai that if she felt any pain to call her back ASAP. Please call. Thank you.

## 2023-09-21 ENCOUNTER — TELEMEDICINE (OUTPATIENT)
Facility: CLINIC | Age: 23
End: 2023-09-21
Payer: COMMERCIAL

## 2023-09-21 DIAGNOSIS — N75.0 BARTHOLIN CYST: Primary | ICD-10-CM

## 2023-09-21 PROCEDURE — 99214 OFFICE O/P EST MOD 30 MIN: CPT | Performed by: STUDENT IN AN ORGANIZED HEALTH CARE EDUCATION/TRAINING PROGRAM

## 2023-09-21 ASSESSMENT — ENCOUNTER SYMPTOMS
DIARRHEA: 0
SHORTNESS OF BREATH: 0
COLOR CHANGE: 0
ABDOMINAL PAIN: 0
BACK PAIN: 0
CONSTIPATION: 0
VOMITING: 0
EYE REDNESS: 0
EYE ITCHING: 0
EYE DISCHARGE: 0
FACIAL SWELLING: 0
EYE PAIN: 0

## 2023-09-21 NOTE — PROGRESS NOTES
Melinda Talbot presents today for   Chief Complaint   Patient presents with    Follow-up        Virtual/telephone visit     Health Maintenance Due   Topic Date Due    Hepatitis B vaccine (1 of 3 - 3-dose series) Never done    COVID-19 Vaccine (1) Never done    Varicella vaccine (1 of 2 - 2-dose childhood series) Never done    Chlamydia/GC screen  Never done    DTaP/Tdap/Td vaccine (1 - Tdap) Never done    HPV vaccine (2 - 3-dose series) 08/17/2022    A1C test (Diabetic or Prediabetic)  07/20/2023    Flu vaccine (1) 08/01/2023   . Coordination of Care:  1. \"Have you been to the ER, urgent care clinic since your last visit? Hospitalized since your last visit? \" No    2. \"Have you seen or consulted any other health care providers outside of the [address] [place] [address] since your last visit? \" No     3. For patients aged 43-73: Has the patient had a colonoscopy / FIT/ Cologuard? NA - based on age      If the patient is female:    4. For patients aged 43-66: Has the patient had a mammogram within the past 2 years? NA - based on age or sex      11. For patients aged 21-65: Has the patient had a pap smear?  Yes - no Care Gap present

## 2023-09-21 NOTE — PROGRESS NOTES
Josette Chambers is a 24 y.o.  female and presents with    Chief Complaint   Patient presents with    Follow-up     Darion DARSHAN Arti, was evaluated through a synchronous (real-time) audio-video encounter. The patient (or guardian if applicable) is aware that this is a billable service, which includes applicable co-pays. This Virtual Visit was conducted with patient's (and/or legal guardian's) consent. Patient identification was verified, and a caregiver was present when appropriate. The patient was located at Home: [facility] [address]  Provider was located at Facility (Appt Dept): [address] [place] [address],  [address] [place] [address] on 9/21/2023 at 10:56 AM    An electronic signature was used to authenticate this note. Subjective:    Pt states that the cyst that was seen 6 days ago has been more painful. States that she has been doing the epsom salts baths and feels that it tends to swell more with this, and even feels like the nodule has harden. She states she is in significant pain now. IT is painful to sit or move. She has been having to walk with her legs apart. She feels like the pain started 3 days ago but it has been aggravating. There is no problem list on file for this patient. Past Medical History:   Diagnosis Date    Migraine       History reviewed. No pertinent surgical history.    Family History   Problem Relation Age of Onset    Stroke Father 36    No Known Problems Mother     Diabetes Father     No Known Problems Sister     No Known Problems Maternal Grandmother     No Known Problems Paternal Grandmother     Stroke Paternal Grandfather     Hypertension Father      Social History     Socioeconomic History    Marital status: Single     Spouse name: Not on file    Number of children: Not on file    Years of education: Not on file    Highest education level: Not on file   Occupational History    Not on file   Tobacco Use    Smoking

## 2023-09-22 LAB
BACTERIAL VAGINOSIS, NAA: NEGATIVE
C TRACH DNA SPEC NAA+PROBE: NEGATIVE
CANDIDA ALBICANS, NAA: NEGATIVE
CANDIDA GLABRATA, NAA: NEGATIVE
CANDIDA GLABRATA, NAA: NEGATIVE
CANDIDA KRUSEI: NEGATIVE
CANDIDA PARAPSILOSIS/TROPICALIS: NEGATIVE
CANDIDA SPECIES, NAA: NEGATIVE
Lab: NEGATIVE
NEISSERIA GONORRHOEAE, NAA: NEGATIVE
TRICHOMONAS VAGINALIS BY NAA: NEGATIVE

## 2025-04-08 ENCOUNTER — OFFICE VISIT (OUTPATIENT)
Facility: CLINIC | Age: 25
End: 2025-04-08
Payer: MEDICAID

## 2025-04-08 VITALS
HEART RATE: 75 BPM | RESPIRATION RATE: 18 BRPM | BODY MASS INDEX: 34.29 KG/M2 | TEMPERATURE: 97.8 F | DIASTOLIC BLOOD PRESSURE: 64 MMHG | OXYGEN SATURATION: 97 % | WEIGHT: 181.6 LBS | HEIGHT: 61 IN | SYSTOLIC BLOOD PRESSURE: 95 MMHG

## 2025-04-08 DIAGNOSIS — N75.0 BARTHOLIN GLAND CYST: ICD-10-CM

## 2025-04-08 DIAGNOSIS — E28.2 PCOS (POLYCYSTIC OVARIAN SYNDROME): Primary | ICD-10-CM

## 2025-04-08 PROCEDURE — 99214 OFFICE O/P EST MOD 30 MIN: CPT | Performed by: STUDENT IN AN ORGANIZED HEALTH CARE EDUCATION/TRAINING PROGRAM

## 2025-04-08 RX ORDER — SULFAMETHOXAZOLE AND TRIMETHOPRIM 800; 160 MG/1; MG/1
1 TABLET ORAL 2 TIMES DAILY
Qty: 14 TABLET | Refills: 0 | Status: SHIPPED | OUTPATIENT
Start: 2025-04-08 | End: 2025-04-15

## 2025-04-08 SDOH — ECONOMIC STABILITY: FOOD INSECURITY: WITHIN THE PAST 12 MONTHS, YOU WORRIED THAT YOUR FOOD WOULD RUN OUT BEFORE YOU GOT MONEY TO BUY MORE.: NEVER TRUE

## 2025-04-08 SDOH — ECONOMIC STABILITY: FOOD INSECURITY: WITHIN THE PAST 12 MONTHS, THE FOOD YOU BOUGHT JUST DIDN'T LAST AND YOU DIDN'T HAVE MONEY TO GET MORE.: NEVER TRUE

## 2025-04-08 ASSESSMENT — ENCOUNTER SYMPTOMS
DIARRHEA: 0
EYE REDNESS: 0
FACIAL SWELLING: 0
BACK PAIN: 0
COLOR CHANGE: 0
EYE PAIN: 0
SHORTNESS OF BREATH: 0
VOMITING: 0
CONSTIPATION: 0
EYE ITCHING: 0
EYE DISCHARGE: 0
ABDOMINAL PAIN: 0

## 2025-04-08 ASSESSMENT — PATIENT HEALTH QUESTIONNAIRE - PHQ9
1. LITTLE INTEREST OR PLEASURE IN DOING THINGS: NOT AT ALL
SUM OF ALL RESPONSES TO PHQ QUESTIONS 1-9: 0
2. FEELING DOWN, DEPRESSED OR HOPELESS: NOT AT ALL
SUM OF ALL RESPONSES TO PHQ QUESTIONS 1-9: 0

## 2025-04-08 NOTE — PROGRESS NOTES
Amenda DARSHAN Chi is a 24 y.o. year old female who presents today for   Chief Complaint   Patient presents with    Cyst       \"Have you been to the ER, urgent care clinic since your last visit?  Hospitalized since your last visit?\"    ER -- abdominal pain (gastroenteritis)    “Have you seen or consulted any other health care providers outside our system since your last visit?”    no     “Have you had a pap smear?”    NO    Date of last Cervical Cancer screen (HPV or PAP): 8/25/2021           Click Here for Release of Records Request    - Gianna Nolen LPN  [facility]  Phone: [phone]  Fax: [phone] B Opoku is a 24 y.o. year old female who presents today for   Chief Complaint   Patient presents with    Cyst       \"Have you been to the ER, urgent care clinic since your last visit?  Hospitalized since your last visit?\"    ER -- gastroenteritis    “Have you seen or consulted any other health care providers outside our system since your last visit?”    no     “Have you had a pap smear?”    NO    Date of last Cervical Cancer screen (HPV or PAP): 8/25/2021           Click Here for Release of Records Request    - Gianna Nolen LPN  [facility] [employer]  [facility]  Phone: [phone]  Fax: [phone]

## 2025-04-08 NOTE — PROGRESS NOTES
Darion Chi is a 24 y.o.  female and presents with    Chief Complaint   Patient presents with    Cyst           Subjective:    She has a cyst that been there since 2023. She had previously another cyst that was seen by GYN and then was drained. She states she was told it was all blood and nothing else was done. She has an appt w/ GYN on 04/18/2025    Patient also feels that she has been having issues when it comes to her menses, they have been irregular, she is sexually active, however she states that her boyfriend is a long distance relationship.  She has been noted also that she has been having darker hairs under her chin.  Previously patient tried phentermine for weight loss, but then had a trip and the treatment was stopped.     There is no problem list on file for this patient.     Past Medical History:   Diagnosis Date    Migraine       History reviewed. No pertinent surgical history.   Family History   Problem Relation Age of Onset    Stroke Father 40    Diabetes Father     Hypertension Father     High Blood Pressure Father     No Known Problems Mother     No Known Problems Sister     No Known Problems Maternal Grandmother     Stroke Paternal Grandmother     Stroke Paternal Grandfather      Social History     Socioeconomic History    Marital status: Single     Spouse name: Not on file    Number of children: Not on file    Years of education: Not on file    Highest education level: Not on file   Occupational History    Not on file   Tobacco Use    Smoking status: Never    Smokeless tobacco: Never   Substance and Sexual Activity    Alcohol use: Yes     Alcohol/week: 1.0 standard drink of alcohol     Types: 1 Glasses of wine per week    Drug use: No    Sexual activity: Yes     Partners: Male     Birth control/protection: Condom   Other Topics Concern    Not on file   Social History Narrative    Not on file     Social Drivers of Health     Financial Resource Strain: Patient Declined (9/15/2023)

## 2025-04-11 ENCOUNTER — HOSPITAL ENCOUNTER (OUTPATIENT)
Facility: HOSPITAL | Age: 25
Setting detail: SPECIMEN
Discharge: HOME | End: 2025-04-14

## 2025-04-11 ENCOUNTER — LAB (OUTPATIENT)
Facility: CLINIC | Age: 25
End: 2025-04-11

## 2025-04-11 DIAGNOSIS — E28.2 PCOS (POLYCYSTIC OVARIAN SYNDROME): ICD-10-CM

## 2025-04-11 LAB — SENTARA SPECIMEN COLLECTION: NORMAL

## 2025-04-11 PROCEDURE — 99001 SPECIMEN HANDLING PT-LAB: CPT

## 2025-04-12 LAB
A/G RATIO: 1.5 RATIO (ref 1.1–2.6)
ALBUMIN: 4.3 G/DL (ref 3.5–5)
ALP BLD-CCNC: 48 U/L (ref 25–115)
ALT SERPL-CCNC: 9 U/L (ref 5–40)
ANION GAP SERPL CALCULATED.3IONS-SCNC: 11 MMOL/L (ref 3–15)
ANISOCYTOSIS: ABNORMAL
AST SERPL-CCNC: 13 U/L (ref 10–37)
BASOPHILS # BLD: 0 % (ref 0–2)
BASOPHILS ABSOLUTE: 0 K/UL (ref 0–0.2)
BILIRUB SERPL-MCNC: 0.1 MG/DL (ref 0.2–1.2)
BUN BLDV-MCNC: 10 MG/DL (ref 6–22)
BURR CELLS: ABNORMAL
CALCIUM SERPL-MCNC: 9.7 MG/DL (ref 8.4–10.5)
CHLORIDE BLD-SCNC: 105 MMOL/L (ref 98–110)
CO2: 22 MMOL/L (ref 20–32)
CREAT SERPL-MCNC: 0.9 MG/DL (ref 0.5–1.2)
EOSINOPHIL # BLD: 1 % (ref 0–6)
EOSINOPHILS ABSOLUTE: 0.1 K/UL (ref 0–0.5)
FOLLICLE STIMULATING HORMONE: 1.7 MIU/ML
GFR, ESTIMATED: >60 ML/MIN/1.73 SQ.M.
GLOBULIN: 2.9 G/DL (ref 2–4)
GLUCOSE: 109 MG/DL (ref 70–99)
HCT VFR BLD CALC: 35.5 % (ref 35.1–46.5)
HEMOGLOBIN: 10.8 G/DL (ref 11.7–15.5)
HYPOCHROMASIA: ABNORMAL
LUTEINIZING HORMONE: 5.6 MIU/ML
LYMPHOCYTES # BLD: 40 % (ref 20–45)
LYMPHOCYTES ABSOLUTE: 2.8 K/UL (ref 1–4.8)
MCH RBC QN AUTO: 21 PG (ref 26–34)
MCHC RBC AUTO-ENTMCNC: 30 G/DL (ref 31–36)
MCV RBC AUTO: 69 FL (ref 80–99)
MICROCYTES: ABNORMAL
MONOCYTES ABSOLUTE: 0.5 K/UL (ref 0.1–1)
MONOCYTES: 8 % (ref 3–12)
NEUTROPHILS ABSOLUTE: 3.6 K/UL (ref 1.8–7.7)
NEUTROPHILS SEGMENTED: 51 % (ref 40–75)
PDW BLD-RTO: 17.2 % (ref 10–15.5)
PLATELET # BLD: 312 K/UL (ref 140–440)
PMV BLD AUTO: 11.5 FL (ref 9–13)
POLYCHROMASIA: ABNORMAL
POTASSIUM SERPL-SCNC: 4.9 MMOL/L (ref 3.5–5.5)
RBC # BLD: 5.14 M/UL (ref 3.8–5.2)
RBC FRAGMENTS: ABNORMAL
SMEAR REVIEW: ABNORMAL
SODIUM BLD-SCNC: 138 MMOL/L (ref 133–145)
TOTAL PROTEIN: 7.2 G/DL (ref 6.4–8.3)
WBC # BLD: 7.1 K/UL (ref 4–11)

## 2025-04-15 LAB
TESTOSTERONE FREE: 6 PG/ML (ref 0.1–6.4)
TESTOSTERONE: 73 NG/DL (ref 2–45)

## 2025-04-17 ENCOUNTER — TELEMEDICINE (OUTPATIENT)
Facility: CLINIC | Age: 25
End: 2025-04-17
Payer: MEDICAID

## 2025-04-17 DIAGNOSIS — D50.9 MICROCYTIC ANEMIA: ICD-10-CM

## 2025-04-17 DIAGNOSIS — N90.7 LABIAL CYST: Primary | ICD-10-CM

## 2025-04-17 DIAGNOSIS — D50.9 IRON DEFICIENCY ANEMIA, UNSPECIFIED IRON DEFICIENCY ANEMIA TYPE: ICD-10-CM

## 2025-04-17 DIAGNOSIS — E28.2 PCOS (POLYCYSTIC OVARIAN SYNDROME): ICD-10-CM

## 2025-04-17 PROCEDURE — 99214 OFFICE O/P EST MOD 30 MIN: CPT | Performed by: STUDENT IN AN ORGANIZED HEALTH CARE EDUCATION/TRAINING PROGRAM

## 2025-04-17 RX ORDER — FERROUS SULFATE 325(65) MG
325 TABLET ORAL
Qty: 90 TABLET | Refills: 1 | Status: SHIPPED | OUTPATIENT
Start: 2025-04-17

## 2025-04-17 NOTE — PROGRESS NOTES
Darion Chi is a 24 y.o.  female and presents with    No chief complaint on file.      Darion Chi, was evaluated through a synchronous (real-time) audio-video encounter. The patient (or guardian if applicable) is aware that this is a billable service, which includes applicable co-pays. This Virtual Visit was conducted with patient's (and/or legal guardian's) consent. Patient identification was verified, and a caregiver was present when appropriate.   The patient was located at Home: [address] [place] [id number]  Provider was located at Facility (Appt Dept): [address] [facility], [address]  [place] [address]  Confirm you are appropriately licensed, registered, or certified to deliver care in the state where the patient is located as indicated above. If you are not or unsure, please re-schedule the visit: Yes, I confirm.          --Joanie Stacy MD on 4/17/2025 at 5:02 PM    An electronic signature was used to authenticate this note.      Subjective:    She continues to have pain in the cyst of her labia.  She is supposed to have appointment on 4.18, but would like to go to Dr. Burton for an evaluation of this. She feels that after taking the abx the cyst improved.     Pt had blood work done.       There is no problem list on file for this patient.     Past Medical History:   Diagnosis Date    Migraine       No past surgical history on file.   Family History   Problem Relation Age of Onset    Stroke Father 40    Diabetes Father     Hypertension Father     High Blood Pressure Father     No Known Problems Mother     No Known Problems Sister     No Known Problems Maternal Grandmother     Stroke Paternal Grandmother     Stroke Paternal Grandfather      Social History     Socioeconomic History    Marital status: Single     Spouse name: Not on file    Number of children: Not on file    Years of education: Not on file    Highest education level: Not on file   Occupational

## 2025-06-16 ENCOUNTER — TELEPHONE (OUTPATIENT)
Facility: CLINIC | Age: 25
End: 2025-06-16

## 2025-06-16 DIAGNOSIS — R73.9 HYPERGLYCEMIA: Primary | ICD-10-CM

## 2025-06-26 ENCOUNTER — HOSPITAL ENCOUNTER (OUTPATIENT)
Facility: HOSPITAL | Age: 25
Setting detail: SPECIMEN
Discharge: HOME | End: 2025-06-29

## 2025-06-26 ENCOUNTER — LAB (OUTPATIENT)
Facility: CLINIC | Age: 25
End: 2025-06-26

## 2025-06-26 DIAGNOSIS — D50.9 MICROCYTIC ANEMIA: ICD-10-CM

## 2025-06-26 DIAGNOSIS — R73.9 HYPERGLYCEMIA: ICD-10-CM

## 2025-06-26 DIAGNOSIS — E28.2 PCOS (POLYCYSTIC OVARIAN SYNDROME): ICD-10-CM

## 2025-06-26 LAB — SENTARA SPECIMEN COLLECTION: NORMAL

## 2025-06-26 PROCEDURE — 99001 SPECIMEN HANDLING PT-LAB: CPT

## 2025-06-27 LAB
A/G RATIO: 1.6 RATIO (ref 1.1–2.6)
ALBUMIN: 4.5 G/DL (ref 3.5–5)
ALP BLD-CCNC: 51 U/L (ref 25–115)
ALT SERPL-CCNC: 29 U/L (ref 5–40)
ANION GAP SERPL CALCULATED.3IONS-SCNC: 14 MMOL/L (ref 3–15)
AST SERPL-CCNC: 28 U/L (ref 10–37)
BASOPHILS # BLD: 1 % (ref 0–2)
BASOPHILS ABSOLUTE: 0 K/UL (ref 0–0.2)
BILIRUB SERPL-MCNC: 0.3 MG/DL (ref 0.2–1.2)
BUN BLDV-MCNC: 10 MG/DL (ref 6–22)
CALCIUM SERPL-MCNC: 9.4 MG/DL (ref 8.4–10.5)
CHLORIDE BLD-SCNC: 102 MMOL/L (ref 98–110)
CO2: 23 MMOL/L (ref 20–32)
CREAT SERPL-MCNC: 0.7 MG/DL (ref 0.5–1.2)
EOSINOPHIL # BLD: 1 % (ref 0–6)
EOSINOPHILS ABSOLUTE: 0.1 K/UL (ref 0–0.5)
ESTIMATED AVERAGE GLUCOSE: 114 MG/DL (ref 91–123)
GFR, ESTIMATED: >90 ML/MIN/1.73 SQ.M.
GLOBULIN: 2.8 G/DL (ref 2–4)
GLUCOSE: 112 MG/DL (ref 70–99)
HBA1C MFR BLD: 5.6 % (ref 4.8–5.6)
HCT VFR BLD CALC: 37.7 % (ref 35.1–46.5)
HEMOGLOBIN: 11.4 G/DL (ref 11.7–15.5)
IRON % SATURATION: 11 % (ref 20–50)
IRON: 41 MCG/DL (ref 30–160)
LYMPHOCYTES # BLD: 41 % (ref 20–45)
LYMPHOCYTES ABSOLUTE: 2.1 K/UL (ref 1–4.8)
MCH RBC QN AUTO: 22 PG (ref 26–34)
MCHC RBC AUTO-ENTMCNC: 30 G/DL (ref 31–36)
MCV RBC AUTO: 71 FL (ref 80–99)
MONOCYTES ABSOLUTE: 0.4 K/UL (ref 0.1–1)
MONOCYTES: 8 % (ref 3–12)
NEUTROPHILS ABSOLUTE: 2.5 K/UL (ref 1.8–7.7)
NEUTROPHILS SEGMENTED: 48 % (ref 40–75)
PDW BLD-RTO: 16.7 % (ref 10–15.5)
PLATELET # BLD: 278 K/UL (ref 140–440)
PMV BLD AUTO: 11.5 FL (ref 9–13)
POTASSIUM SERPL-SCNC: 4.4 MMOL/L (ref 3.5–5.5)
RBC # BLD: 5.28 M/UL (ref 3.8–5.2)
SODIUM BLD-SCNC: 139 MMOL/L (ref 133–145)
TOTAL IRON BINDING CAPACITY: 364 MCG/DL (ref 228–428)
TOTAL PROTEIN: 7.3 G/DL (ref 6.4–8.3)
TSH SERPL DL<=0.05 MIU/L-ACNC: 1.17 MCU/ML (ref 0.27–4.2)
UNSATURATED IRON BINDING CAPACITY: 323 MCG/DL (ref 110–370)
WBC # BLD: 5.2 K/UL (ref 4–11)

## 2025-07-01 LAB
TESTOSTERONE FREE: 8 PG/ML (ref 0.1–6.4)
TESTOSTERONE: 96 NG/DL (ref 2–45)

## 2025-07-02 LAB
HEMOGLOBIN A1: 96.3 % (ref 96–98)
HEMOGLOBIN A2: 3.7 % (ref 2–4)
HEMOGLOBIN C: 0 % (ref 0–0)
HEMOGLOBIN D: 0 % (ref 0–0)
HEMOGLOBIN F: 0 % (ref 0–0.1)
HEMOGLOBIN S SCREEN: NORMAL
HEMOGLOBIN S: 0 % (ref 0–0)
HEMOGLOBIN UNKNOWN: 0 % (ref 0–0)
HGB ELECTROPHORESIS INTERP: NORMAL

## 2025-07-03 ENCOUNTER — OFFICE VISIT (OUTPATIENT)
Facility: CLINIC | Age: 25
End: 2025-07-03
Payer: MEDICAID

## 2025-07-03 VITALS
RESPIRATION RATE: 15 BRPM | HEART RATE: 86 BPM | BODY MASS INDEX: 34.44 KG/M2 | WEIGHT: 182.4 LBS | OXYGEN SATURATION: 99 % | DIASTOLIC BLOOD PRESSURE: 73 MMHG | SYSTOLIC BLOOD PRESSURE: 106 MMHG | HEIGHT: 61 IN | TEMPERATURE: 97.1 F

## 2025-07-03 DIAGNOSIS — E66.811 CLASS 1 OBESITY WITH SERIOUS COMORBIDITY AND BODY MASS INDEX (BMI) OF 34.0 TO 34.9 IN ADULT, UNSPECIFIED OBESITY TYPE: ICD-10-CM

## 2025-07-03 DIAGNOSIS — E28.2 PCOS (POLYCYSTIC OVARIAN SYNDROME): Primary | ICD-10-CM

## 2025-07-03 DIAGNOSIS — D50.9 IRON DEFICIENCY ANEMIA, UNSPECIFIED IRON DEFICIENCY ANEMIA TYPE: ICD-10-CM

## 2025-07-03 PROCEDURE — 99214 OFFICE O/P EST MOD 30 MIN: CPT | Performed by: STUDENT IN AN ORGANIZED HEALTH CARE EDUCATION/TRAINING PROGRAM

## 2025-07-03 RX ORDER — FERROUS SULFATE 325(65) MG
325 TABLET ORAL
Qty: 90 TABLET | Refills: 1 | Status: SHIPPED | OUTPATIENT
Start: 2025-07-03

## 2025-07-03 ASSESSMENT — ENCOUNTER SYMPTOMS
CONSTIPATION: 0
BACK PAIN: 0
EYE PAIN: 0
COLOR CHANGE: 0
DIARRHEA: 0
ABDOMINAL PAIN: 0
VOMITING: 0
EYE REDNESS: 0
FACIAL SWELLING: 0
EYE ITCHING: 0
EYE DISCHARGE: 0
SHORTNESS OF BREATH: 0

## 2025-07-03 NOTE — PROGRESS NOTES
Darion Chi is a 25 y.o. year old female who presents today for   Chief Complaint   Patient presents with    Check-Up        \"Have you been to the ER, urgent care clinic since your last visit?  Hospitalized since your last visit?\"   NO     “Have you seen or consulted any other health care providers outside our system since your last visit?”   NO      “Have you had a pap smear?”    NO    Date of last Cervical Cancer screen (HPV or PAP): 8/25/2021             - SHELTON Gonzalez  [facility]  Phone: [phone]  Fax: [phone]

## 2025-07-03 NOTE — PROGRESS NOTES
Darion Chi is a 25 y.o.  female and presents with    Chief Complaint   Patient presents with    Check-Up           Subjective:    Has noted that her menses have started to become irregular. She has been taking her iron but has not been adding vitamin C too it. She says she started w/ the OJ but then stopped.     There is no problem list on file for this patient.     Past Medical History:   Diagnosis Date    Migraine       No past surgical history on file.   Family History   Problem Relation Age of Onset    Stroke Father 40    Diabetes Father     Hypertension Father     High Blood Pressure Father     No Known Problems Mother     No Known Problems Sister     No Known Problems Maternal Grandmother     Stroke Paternal Grandmother     Stroke Paternal Grandfather      Social History     Socioeconomic History    Marital status: Single     Spouse name: Not on file    Number of children: Not on file    Years of education: Not on file    Highest education level: Not on file   Occupational History    Not on file   Tobacco Use    Smoking status: Never    Smokeless tobacco: Never   Substance and Sexual Activity    Alcohol use: Yes     Alcohol/week: 1.0 standard drink of alcohol     Types: 1 Glasses of wine per week    Drug use: No    Sexual activity: Yes     Partners: Male     Birth control/protection: Condom   Other Topics Concern    Not on file   Social History Narrative    Not on file     Social Drivers of Health     Financial Resource Strain: Patient Declined (9/15/2023)    Overall Financial Resource Strain (CARDIA)     Difficulty of Paying Living Expenses: Patient declined   Food Insecurity: No Food Insecurity (4/8/2025)    Hunger Vital Sign     Worried About Running Out of Food in the Last Year: Never true     Ran Out of Food in the Last Year: Never true   Transportation Needs: No Transportation Needs (4/8/2025)    PRAPARE - Transportation     Lack of Transportation (Medical): No     Lack of Transportation